# Patient Record
Sex: MALE | Race: WHITE | NOT HISPANIC OR LATINO | Employment: OTHER | URBAN - METROPOLITAN AREA
[De-identification: names, ages, dates, MRNs, and addresses within clinical notes are randomized per-mention and may not be internally consistent; named-entity substitution may affect disease eponyms.]

---

## 2023-11-22 ENCOUNTER — APPOINTMENT (OUTPATIENT)
Dept: OTOLARYNGOLOGY | Facility: CLINIC | Age: 78
End: 2023-11-22
Payer: MEDICARE

## 2023-11-22 ENCOUNTER — OFFICE VISIT (OUTPATIENT)
Dept: OTOLARYNGOLOGY | Facility: CLINIC | Age: 78
End: 2023-11-22
Payer: MEDICARE

## 2023-11-22 VITALS — HEIGHT: 70 IN | BODY MASS INDEX: 33.79 KG/M2 | TEMPERATURE: 97.3 F | WEIGHT: 236 LBS

## 2023-11-22 DIAGNOSIS — H61.23 BILATERAL IMPACTED CERUMEN: Primary | ICD-10-CM

## 2023-11-22 DIAGNOSIS — H90.3 BILATERAL SENSORINEURAL HEARING LOSS: ICD-10-CM

## 2023-11-22 PROCEDURE — 99213 OFFICE O/P EST LOW 20 MIN: CPT | Performed by: OTOLARYNGOLOGY

## 2023-11-22 PROCEDURE — 1036F TOBACCO NON-USER: CPT | Performed by: OTOLARYNGOLOGY

## 2023-11-22 PROCEDURE — 1159F MED LIST DOCD IN RCRD: CPT | Performed by: OTOLARYNGOLOGY

## 2023-11-22 RX ORDER — ASPIRIN 81 MG/1
81 TABLET ORAL DAILY
COMMUNITY
End: 2024-05-31 | Stop reason: ALTCHOICE

## 2023-11-22 ASSESSMENT — PATIENT HEALTH QUESTIONNAIRE - PHQ9
1. LITTLE INTEREST OR PLEASURE IN DOING THINGS: NOT AT ALL
2. FEELING DOWN, DEPRESSED OR HOPELESS: NOT AT ALL
SUM OF ALL RESPONSES TO PHQ9 QUESTIONS 1 & 2: 0

## 2023-11-22 NOTE — PROGRESS NOTES
"HPI  Eh Saenz is a 78 y.o. male here for routine cerumen management.  He has baseline sensorineural hearing loss but does not want expensive hearing aids.  Denies otalgia and otorrhea.      Past Medical History:   Diagnosis Date    Kidney stones     Personal history of malignant neoplasm, unspecified     History of malignant neoplasm    Personal history of other diseases of the circulatory system     History of hypertension    Personal history of other diseases of urinary system     History of kidney disease    Prostate cancer (CMS/HCC)             Medications:     Current Outpatient Medications:     aspirin 81 mg EC tablet, Take 1 tablet (81 mg) by mouth once daily., Disp: , Rfl:      Allergies:  Allergies   Allergen Reactions    Bactrim [Sulfamethoxazole-Trimethoprim] Unknown    Ciprofloxacin Unknown    Dicyclomine Unknown    Meloxicam Unknown        Physical Exam:  Last Recorded Vitals  Temperature 36.3 °C (97.3 °F), height 1.778 m (5' 10\"), weight 107 kg (236 lb).  General:     General appearance: Well-developed, well-nourished in no acute distress.       Voice:  normal       Head/face: Normal appearance; nontender to palpation     Facial nerve function: Normal and symmetric bilaterally.    Oral/oropharynx:     Oral vestibule: Normal labial and gingival mucosa     Tongue/floor of mouth: Normal without lesion     Oropharynx: Clear.  No lesions present of the hard/soft palate, posterior pharynx    Neck:     Neck: Normal appearance, trachea midline     Salivary glands: Normal to palpation bilaterally     Lymph nodes: No cervical lymphadenopathy to palpation     Thyroid: No thyromegaly.  No palpable nodules     Range of motion: Normal    Neurological:     Cortical functions: Alert and oriented x3, appropriate affect       Larynx/hypopharynx:     Laryngeal findings: Mirror exam inadequate or limited secondary to enlarged base of tongue and/or excessive gagging    Ear:     Ear canal: Normal bilaterally after " cleaning cerumen impaction bilaterally with wire-loop and microscope     Tympanic membrane: Intact and mobile bilaterally     Pinna: Normal bilaterally     Hearing:  Gross hearing assessment normal by voice    Nose:     Visualized using: Anterior rhinoscopy     Nasopharynx: Inadequate mirror exam secondary to gag, anatomy.       Nasal dorsum: Nontraumatic midline appearance     Septum: Midline     Inferior turbinates: Normally sized     Mucosa: Bilateral, pink, normal appearing       Assessment/Plan   Ears cleaned bilaterally.  Recommend continue routine maintenance and see him back in 4 to 6 months, sooner as needed         Jarrod Patterson MD

## 2024-01-17 ENCOUNTER — APPOINTMENT (OUTPATIENT)
Dept: OTOLARYNGOLOGY | Facility: CLINIC | Age: 79
End: 2024-01-17
Payer: MEDICARE

## 2024-04-03 ENCOUNTER — APPOINTMENT (OUTPATIENT)
Dept: OTOLARYNGOLOGY | Facility: CLINIC | Age: 79
End: 2024-04-03
Payer: MEDICARE

## 2024-04-15 ENCOUNTER — APPOINTMENT (OUTPATIENT)
Dept: RADIOLOGY | Facility: HOSPITAL | Age: 79
End: 2024-04-15
Payer: MEDICARE

## 2024-04-15 ENCOUNTER — APPOINTMENT (OUTPATIENT)
Dept: CARDIOLOGY | Facility: HOSPITAL | Age: 79
End: 2024-04-15
Payer: MEDICARE

## 2024-04-15 ENCOUNTER — HOSPITAL ENCOUNTER (OUTPATIENT)
Facility: HOSPITAL | Age: 79
Setting detail: OBSERVATION
Discharge: HOME | End: 2024-04-20
Attending: STUDENT IN AN ORGANIZED HEALTH CARE EDUCATION/TRAINING PROGRAM | Admitting: INTERNAL MEDICINE
Payer: MEDICARE

## 2024-04-15 DIAGNOSIS — R94.31 ABNORMAL ELECTROCARDIOGRAM (ECG) (EKG): ICD-10-CM

## 2024-04-15 DIAGNOSIS — J01.90 ACUTE SINUSITIS, RECURRENCE NOT SPECIFIED, UNSPECIFIED LOCATION: ICD-10-CM

## 2024-04-15 DIAGNOSIS — I50.9 ACUTE ON CHRONIC HEART FAILURE, UNSPECIFIED HEART FAILURE TYPE (MULTI): Primary | ICD-10-CM

## 2024-04-15 DIAGNOSIS — R79.89 ELEVATED TROPONIN: ICD-10-CM

## 2024-04-15 DIAGNOSIS — R07.9 CHEST PAIN, UNSPECIFIED TYPE: ICD-10-CM

## 2024-04-15 LAB
ALBUMIN SERPL-MCNC: 3.9 G/DL (ref 3.5–5)
ALP BLD-CCNC: 67 U/L (ref 35–125)
ALT SERPL-CCNC: 18 U/L (ref 5–40)
ANION GAP SERPL CALC-SCNC: 9 MMOL/L
APPEARANCE UR: CLEAR
APPEARANCE UR: CLEAR
AST SERPL-CCNC: 19 U/L (ref 5–40)
BASOPHILS # BLD AUTO: 0.03 X10*3/UL (ref 0–0.1)
BASOPHILS NFR BLD AUTO: 0.4 %
BILIRUB SERPL-MCNC: 2.1 MG/DL (ref 0.1–1.2)
BILIRUB UR STRIP.AUTO-MCNC: NEGATIVE MG/DL
BILIRUB UR STRIP.AUTO-MCNC: NEGATIVE MG/DL
BUN SERPL-MCNC: 19 MG/DL (ref 8–25)
CALCIUM SERPL-MCNC: 9.8 MG/DL (ref 8.5–10.4)
CHLORIDE SERPL-SCNC: 102 MMOL/L (ref 97–107)
CO2 SERPL-SCNC: 27 MMOL/L (ref 24–31)
COLOR UR: COLORLESS
COLOR UR: YELLOW
CREAT SERPL-MCNC: 1.2 MG/DL (ref 0.4–1.6)
EGFRCR SERPLBLD CKD-EPI 2021: 61 ML/MIN/1.73M*2
EOSINOPHIL # BLD AUTO: 0.1 X10*3/UL (ref 0–0.4)
EOSINOPHIL NFR BLD AUTO: 1.3 %
ERYTHROCYTE [DISTWIDTH] IN BLOOD BY AUTOMATED COUNT: 14.7 % (ref 11.5–14.5)
FLUAV RNA RESP QL NAA+PROBE: NOT DETECTED
FLUBV RNA RESP QL NAA+PROBE: NOT DETECTED
GLUCOSE SERPL-MCNC: 111 MG/DL (ref 65–99)
GLUCOSE UR STRIP.AUTO-MCNC: NORMAL MG/DL
GLUCOSE UR STRIP.AUTO-MCNC: NORMAL MG/DL
HCT VFR BLD AUTO: 42.2 % (ref 41–52)
HGB BLD-MCNC: 13.2 G/DL (ref 13.5–17.5)
IMM GRANULOCYTES # BLD AUTO: 0.03 X10*3/UL (ref 0–0.5)
IMM GRANULOCYTES NFR BLD AUTO: 0.4 % (ref 0–0.9)
KETONES UR STRIP.AUTO-MCNC: NEGATIVE MG/DL
KETONES UR STRIP.AUTO-MCNC: NEGATIVE MG/DL
LACTATE BLDV-SCNC: 1.9 MMOL/L (ref 0.4–2)
LEUKOCYTE ESTERASE UR QL STRIP.AUTO: ABNORMAL
LEUKOCYTE ESTERASE UR QL STRIP.AUTO: NEGATIVE
LIPASE SERPL-CCNC: 32 U/L (ref 16–63)
LYMPHOCYTES # BLD AUTO: 0.77 X10*3/UL (ref 0.8–3)
LYMPHOCYTES NFR BLD AUTO: 10.1 %
MAGNESIUM SERPL-MCNC: 2.2 MG/DL (ref 1.6–3.1)
MAGNESIUM SERPL-MCNC: 2.2 MG/DL (ref 1.6–3.1)
MCH RBC QN AUTO: 29.5 PG (ref 26–34)
MCHC RBC AUTO-ENTMCNC: 31.3 G/DL (ref 32–36)
MCV RBC AUTO: 94 FL (ref 80–100)
MONOCYTES # BLD AUTO: 1.05 X10*3/UL (ref 0.05–0.8)
MONOCYTES NFR BLD AUTO: 13.7 %
MUCOUS THREADS #/AREA URNS AUTO: NORMAL /LPF
NEUTROPHILS # BLD AUTO: 5.66 X10*3/UL (ref 1.6–5.5)
NEUTROPHILS NFR BLD AUTO: 74.1 %
NITRITE UR QL STRIP.AUTO: NEGATIVE
NITRITE UR QL STRIP.AUTO: NEGATIVE
NRBC BLD-RTO: 0 /100 WBCS (ref 0–0)
NT-PROBNP SERPL-MCNC: 4289 PG/ML (ref 0–852)
PH UR STRIP.AUTO: 6.5 [PH]
PH UR STRIP.AUTO: 6.5 [PH]
PLATELET # BLD AUTO: 194 X10*3/UL (ref 150–450)
POTASSIUM SERPL-SCNC: 5 MMOL/L (ref 3.4–5.1)
PROT SERPL-MCNC: 6.6 G/DL (ref 5.9–7.9)
PROT UR STRIP.AUTO-MCNC: ABNORMAL MG/DL
PROT UR STRIP.AUTO-MCNC: NEGATIVE MG/DL
RBC # BLD AUTO: 4.48 X10*6/UL (ref 4.5–5.9)
RBC # UR STRIP.AUTO: NEGATIVE /UL
RBC # UR STRIP.AUTO: NEGATIVE /UL
RBC #/AREA URNS AUTO: NORMAL /HPF
SARS-COV-2 RNA RESP QL NAA+PROBE: NOT DETECTED
SODIUM SERPL-SCNC: 138 MMOL/L (ref 133–145)
SP GR UR STRIP.AUTO: 1.01
SP GR UR STRIP.AUTO: 1.02
TROPONIN T SERPL-MCNC: 72 NG/L
TROPONIN T SERPL-MCNC: 79 NG/L
TROPONIN T SERPL-MCNC: 91 NG/L
UROBILINOGEN UR STRIP.AUTO-MCNC: ABNORMAL MG/DL
UROBILINOGEN UR STRIP.AUTO-MCNC: NORMAL MG/DL
WBC # BLD AUTO: 7.6 X10*3/UL (ref 4.4–11.3)
WBC #/AREA URNS AUTO: NORMAL /HPF

## 2024-04-15 PROCEDURE — 85025 COMPLETE CBC W/AUTO DIFF WBC: CPT | Performed by: CLINICAL NURSE SPECIALIST

## 2024-04-15 PROCEDURE — 81001 URINALYSIS AUTO W/SCOPE: CPT | Performed by: CLINICAL NURSE SPECIALIST

## 2024-04-15 PROCEDURE — 83690 ASSAY OF LIPASE: CPT | Performed by: CLINICAL NURSE SPECIALIST

## 2024-04-15 PROCEDURE — 80053 COMPREHEN METABOLIC PANEL: CPT | Performed by: CLINICAL NURSE SPECIALIST

## 2024-04-15 PROCEDURE — 96361 HYDRATE IV INFUSION ADD-ON: CPT

## 2024-04-15 PROCEDURE — 84484 ASSAY OF TROPONIN QUANT: CPT | Performed by: CLINICAL NURSE SPECIALIST

## 2024-04-15 PROCEDURE — 70450 CT HEAD/BRAIN W/O DYE: CPT | Performed by: RADIOLOGY

## 2024-04-15 PROCEDURE — G0378 HOSPITAL OBSERVATION PER HR: HCPCS

## 2024-04-15 PROCEDURE — 83735 ASSAY OF MAGNESIUM: CPT | Mod: 91 | Performed by: INTERNAL MEDICINE

## 2024-04-15 PROCEDURE — 83735 ASSAY OF MAGNESIUM: CPT | Performed by: CLINICAL NURSE SPECIALIST

## 2024-04-15 PROCEDURE — 2500000004 HC RX 250 GENERAL PHARMACY W/ HCPCS (ALT 636 FOR OP/ED): Performed by: INTERNAL MEDICINE

## 2024-04-15 PROCEDURE — 83880 ASSAY OF NATRIURETIC PEPTIDE: CPT | Performed by: CLINICAL NURSE SPECIALIST

## 2024-04-15 PROCEDURE — 2500000001 HC RX 250 WO HCPCS SELF ADMINISTERED DRUGS (ALT 637 FOR MEDICARE OP): Performed by: INTERNAL MEDICINE

## 2024-04-15 PROCEDURE — 71046 X-RAY EXAM CHEST 2 VIEWS: CPT

## 2024-04-15 PROCEDURE — 83605 ASSAY OF LACTIC ACID: CPT | Performed by: CLINICAL NURSE SPECIALIST

## 2024-04-15 PROCEDURE — 96374 THER/PROPH/DIAG INJ IV PUSH: CPT

## 2024-04-15 PROCEDURE — 87086 URINE CULTURE/COLONY COUNT: CPT | Mod: TRILAB,WESLAB | Performed by: CLINICAL NURSE SPECIALIST

## 2024-04-15 PROCEDURE — 71046 X-RAY EXAM CHEST 2 VIEWS: CPT | Performed by: RADIOLOGY

## 2024-04-15 PROCEDURE — 36415 COLL VENOUS BLD VENIPUNCTURE: CPT | Performed by: CLINICAL NURSE SPECIALIST

## 2024-04-15 PROCEDURE — 2500000004 HC RX 250 GENERAL PHARMACY W/ HCPCS (ALT 636 FOR OP/ED): Performed by: STUDENT IN AN ORGANIZED HEALTH CARE EDUCATION/TRAINING PROGRAM

## 2024-04-15 PROCEDURE — 81003 URINALYSIS AUTO W/O SCOPE: CPT | Mod: 59 | Performed by: INTERNAL MEDICINE

## 2024-04-15 PROCEDURE — 96376 TX/PRO/DX INJ SAME DRUG ADON: CPT

## 2024-04-15 PROCEDURE — 96372 THER/PROPH/DIAG INJ SC/IM: CPT | Performed by: INTERNAL MEDICINE

## 2024-04-15 PROCEDURE — 87636 SARSCOV2 & INF A&B AMP PRB: CPT | Performed by: CLINICAL NURSE SPECIALIST

## 2024-04-15 PROCEDURE — 2500000004 HC RX 250 GENERAL PHARMACY W/ HCPCS (ALT 636 FOR OP/ED): Performed by: CLINICAL NURSE SPECIALIST

## 2024-04-15 PROCEDURE — 87040 BLOOD CULTURE FOR BACTERIA: CPT | Mod: TRILAB,91 | Performed by: CLINICAL NURSE SPECIALIST

## 2024-04-15 PROCEDURE — 93005 ELECTROCARDIOGRAM TRACING: CPT

## 2024-04-15 PROCEDURE — 70450 CT HEAD/BRAIN W/O DYE: CPT

## 2024-04-15 PROCEDURE — 93010 ELECTROCARDIOGRAM REPORT: CPT | Performed by: INTERNAL MEDICINE

## 2024-04-15 PROCEDURE — 99285 EMERGENCY DEPT VISIT HI MDM: CPT | Mod: 25

## 2024-04-15 RX ORDER — GUAIFENESIN/DEXTROMETHORPHAN 100-10MG/5
5 SYRUP ORAL EVERY 4 HOURS PRN
Status: DISCONTINUED | OUTPATIENT
Start: 2024-04-15 | End: 2024-04-17

## 2024-04-15 RX ORDER — ATORVASTATIN CALCIUM 20 MG/1
20 TABLET, FILM COATED ORAL NIGHTLY
COMMUNITY

## 2024-04-15 RX ORDER — ACETAMINOPHEN 325 MG/1
650 TABLET ORAL EVERY 6 HOURS PRN
Status: DISCONTINUED | OUTPATIENT
Start: 2024-04-15 | End: 2024-04-20 | Stop reason: HOSPADM

## 2024-04-15 RX ORDER — ACETAMINOPHEN 160 MG/5ML
650 SOLUTION ORAL EVERY 6 HOURS PRN
Status: DISCONTINUED | OUTPATIENT
Start: 2024-04-15 | End: 2024-04-20 | Stop reason: HOSPADM

## 2024-04-15 RX ORDER — GUAIFENESIN 600 MG/1
600 TABLET, EXTENDED RELEASE ORAL EVERY 12 HOURS PRN
Status: DISCONTINUED | OUTPATIENT
Start: 2024-04-15 | End: 2024-04-20 | Stop reason: HOSPADM

## 2024-04-15 RX ORDER — FUROSEMIDE 10 MG/ML
40 INJECTION INTRAMUSCULAR; INTRAVENOUS ONCE
Status: DISCONTINUED | OUTPATIENT
Start: 2024-04-15 | End: 2024-04-15 | Stop reason: SDUPTHER

## 2024-04-15 RX ORDER — BISMUTH SUBSALICYLATE 262 MG
1 TABLET,CHEWABLE ORAL DAILY
Status: DISCONTINUED | OUTPATIENT
Start: 2024-04-15 | End: 2024-04-20 | Stop reason: HOSPADM

## 2024-04-15 RX ORDER — ASPIRIN 81 MG/1
TABLET ORAL
COMMUNITY
End: 2024-04-15 | Stop reason: ENTERED-IN-ERROR

## 2024-04-15 RX ORDER — ATORVASTATIN CALCIUM 40 MG/1
40 TABLET, FILM COATED ORAL NIGHTLY
Status: DISCONTINUED | OUTPATIENT
Start: 2024-04-15 | End: 2024-04-20 | Stop reason: HOSPADM

## 2024-04-15 RX ORDER — VIT C/E/ZN/COPPR/LUTEIN/ZEAXAN 250MG-90MG
25 CAPSULE ORAL DAILY
COMMUNITY

## 2024-04-15 RX ORDER — ACETAMINOPHEN 650 MG/1
650 SUPPOSITORY RECTAL EVERY 6 HOURS PRN
Status: DISCONTINUED | OUTPATIENT
Start: 2024-04-15 | End: 2024-04-20 | Stop reason: HOSPADM

## 2024-04-15 RX ORDER — FUROSEMIDE 40 MG/1
40 TABLET ORAL DAILY
Status: DISCONTINUED | OUTPATIENT
Start: 2024-04-16 | End: 2024-04-20 | Stop reason: HOSPADM

## 2024-04-15 RX ORDER — HEPARIN SODIUM 5000 [USP'U]/ML
5000 INJECTION, SOLUTION INTRAVENOUS; SUBCUTANEOUS EVERY 8 HOURS
Status: DISCONTINUED | OUTPATIENT
Start: 2024-04-15 | End: 2024-04-15

## 2024-04-15 RX ORDER — FUROSEMIDE 40 MG/1
40 TABLET ORAL DAILY
COMMUNITY

## 2024-04-15 RX ORDER — POLYETHYLENE GLYCOL 3350 17 G/17G
17 POWDER, FOR SOLUTION ORAL DAILY PRN
Status: DISCONTINUED | OUTPATIENT
Start: 2024-04-15 | End: 2024-04-20 | Stop reason: HOSPADM

## 2024-04-15 RX ORDER — ACETAMINOPHEN 325 MG/1
650 TABLET ORAL ONCE
Status: COMPLETED | OUTPATIENT
Start: 2024-04-15 | End: 2024-04-15

## 2024-04-15 RX ORDER — ASPIRIN 81 MG/1
81 TABLET ORAL DAILY
Status: DISCONTINUED | OUTPATIENT
Start: 2024-04-15 | End: 2024-04-20 | Stop reason: HOSPADM

## 2024-04-15 RX ORDER — BISMUTH SUBSALICYLATE 262 MG
1 TABLET,CHEWABLE ORAL DAILY
COMMUNITY

## 2024-04-15 RX ORDER — FUROSEMIDE 10 MG/ML
40 INJECTION INTRAMUSCULAR; INTRAVENOUS EVERY 8 HOURS
Status: DISPENSED | OUTPATIENT
Start: 2024-04-15 | End: 2024-04-16

## 2024-04-15 RX ORDER — MAGNESIUM SULFATE HEPTAHYDRATE 40 MG/ML
2 INJECTION, SOLUTION INTRAVENOUS ONCE
Status: DISCONTINUED | OUTPATIENT
Start: 2024-04-15 | End: 2024-04-17

## 2024-04-15 RX ORDER — ACETAMINOPHEN 325 MG/1
650 TABLET ORAL ONCE
Status: DISCONTINUED | OUTPATIENT
Start: 2024-04-15 | End: 2024-04-15 | Stop reason: SDUPTHER

## 2024-04-15 RX ORDER — POTASSIUM CHLORIDE 20 MEQ/1
20 TABLET, EXTENDED RELEASE ORAL DAILY
COMMUNITY

## 2024-04-15 RX ORDER — FUROSEMIDE 10 MG/ML
40 INJECTION INTRAMUSCULAR; INTRAVENOUS ONCE
Status: COMPLETED | OUTPATIENT
Start: 2024-04-15 | End: 2024-04-15

## 2024-04-15 RX ORDER — POTASSIUM CHLORIDE 20 MEQ/1
20 TABLET, EXTENDED RELEASE ORAL DAILY
Status: DISCONTINUED | OUTPATIENT
Start: 2024-04-16 | End: 2024-04-20 | Stop reason: HOSPADM

## 2024-04-15 RX ORDER — FUROSEMIDE 10 MG/ML
40 INJECTION INTRAMUSCULAR; INTRAVENOUS EVERY 8 HOURS
Status: DISCONTINUED | OUTPATIENT
Start: 2024-04-15 | End: 2024-04-15 | Stop reason: SDUPTHER

## 2024-04-15 RX ORDER — HEPARIN SODIUM 5000 [USP'U]/ML
5000 INJECTION, SOLUTION INTRAVENOUS; SUBCUTANEOUS EVERY 8 HOURS SCHEDULED
Status: DISCONTINUED | OUTPATIENT
Start: 2024-04-15 | End: 2024-04-20 | Stop reason: HOSPADM

## 2024-04-15 RX ORDER — CHOLECALCIFEROL (VITAMIN D3) 125 MCG
5000 CAPSULE ORAL DAILY
Status: DISCONTINUED | OUTPATIENT
Start: 2024-04-15 | End: 2024-04-20 | Stop reason: HOSPADM

## 2024-04-15 RX ADMIN — SACUBITRIL AND VALSARTAN 1 TABLET: 24; 26 TABLET, FILM COATED ORAL at 21:35

## 2024-04-15 RX ADMIN — FUROSEMIDE 40 MG: 10 INJECTION, SOLUTION INTRAMUSCULAR; INTRAVENOUS at 21:34

## 2024-04-15 RX ADMIN — ACETAMINOPHEN 650 MG: 325 TABLET ORAL at 10:35

## 2024-04-15 RX ADMIN — HEPARIN SODIUM 5000 UNITS: 5000 INJECTION, SOLUTION INTRAVENOUS; SUBCUTANEOUS at 21:34

## 2024-04-15 RX ADMIN — FUROSEMIDE 40 MG: 10 INJECTION, SOLUTION INTRAMUSCULAR; INTRAVENOUS at 13:06

## 2024-04-15 RX ADMIN — SODIUM CHLORIDE 500 ML: 9 INJECTION, SOLUTION INTRAVENOUS at 10:25

## 2024-04-15 RX ADMIN — ATORVASTATIN CALCIUM 40 MG: 40 TABLET, FILM COATED ORAL at 21:35

## 2024-04-15 SDOH — HEALTH STABILITY: MENTAL HEALTH
EXPERIENCED ANY OF THE FOLLOWING LIFE EVENTS: DEATH OF FAMILY/FRIEND;SOCIAL LOSS (BANKRUPTCY, DIVORCE, WORK-RELATED STRESS)

## 2024-04-15 SDOH — SOCIAL STABILITY: SOCIAL INSECURITY: DO YOU FEEL ANYONE HAS EXPLOITED OR TAKEN ADVANTAGE OF YOU FINANCIALLY OR OF YOUR PERSONAL PROPERTY?: NO

## 2024-04-15 SDOH — SOCIAL STABILITY: SOCIAL INSECURITY: HAVE YOU HAD THOUGHTS OF HARMING ANYONE ELSE?: NO

## 2024-04-15 SDOH — SOCIAL STABILITY: SOCIAL INSECURITY: WERE YOU ABLE TO COMPLETE ALL THE BEHAVIORAL HEALTH SCREENINGS?: YES

## 2024-04-15 SDOH — SOCIAL STABILITY: SOCIAL INSECURITY: ABUSE: ADULT

## 2024-04-15 SDOH — SOCIAL STABILITY: SOCIAL INSECURITY: DOES ANYONE TRY TO KEEP YOU FROM HAVING/CONTACTING OTHER FRIENDS OR DOING THINGS OUTSIDE YOUR HOME?: NO

## 2024-04-15 SDOH — SOCIAL STABILITY: SOCIAL INSECURITY: ARE YOU OR HAVE YOU BEEN THREATENED OR ABUSED PHYSICALLY, EMOTIONALLY, OR SEXUALLY BY ANYONE?: NO

## 2024-04-15 SDOH — SOCIAL STABILITY: SOCIAL INSECURITY: HAS ANYONE EVER THREATENED TO HURT YOUR FAMILY OR YOUR PETS?: NO

## 2024-04-15 SDOH — SOCIAL STABILITY: SOCIAL INSECURITY: DO YOU FEEL UNSAFE GOING BACK TO THE PLACE WHERE YOU ARE LIVING?: NO

## 2024-04-15 SDOH — SOCIAL STABILITY: SOCIAL INSECURITY: ARE THERE ANY APPARENT SIGNS OF INJURIES/BEHAVIORS THAT COULD BE RELATED TO ABUSE/NEGLECT?: NO

## 2024-04-15 ASSESSMENT — PATIENT HEALTH QUESTIONNAIRE - PHQ9
1. LITTLE INTEREST OR PLEASURE IN DOING THINGS: NOT AT ALL
SUM OF ALL RESPONSES TO PHQ9 QUESTIONS 1 & 2: 0
2. FEELING DOWN, DEPRESSED OR HOPELESS: NOT AT ALL

## 2024-04-15 ASSESSMENT — LIFESTYLE VARIABLES
AUDIT-C TOTAL SCORE: 0
SKIP TO QUESTIONS 9-10: 1
SUBSTANCE_ABUSE_PAST_12_MONTHS: NO
HOW OFTEN DO YOU HAVE 6 OR MORE DRINKS ON ONE OCCASION: NEVER
AUDIT-C TOTAL SCORE: 0
HOW OFTEN DO YOU HAVE A DRINK CONTAINING ALCOHOL: NEVER
HOW MANY STANDARD DRINKS CONTAINING ALCOHOL DO YOU HAVE ON A TYPICAL DAY: PATIENT DOES NOT DRINK
PRESCIPTION_ABUSE_PAST_12_MONTHS: NO

## 2024-04-15 ASSESSMENT — PAIN SCALES - GENERAL
PAINLEVEL_OUTOF10: 4
PAINLEVEL_OUTOF10: 0 - NO PAIN
PAINLEVEL_OUTOF10: 4
PAINLEVEL_OUTOF10: 4

## 2024-04-15 ASSESSMENT — ACTIVITIES OF DAILY LIVING (ADL)
WALKS IN HOME: INDEPENDENT
HEARING - LEFT EAR: DIFFICULTY WITH NOISE
ADEQUATE_TO_COMPLETE_ADL: YES
FEEDING YOURSELF: INDEPENDENT
DRESSING YOURSELF: INDEPENDENT
TOILETING: INDEPENDENT
PATIENT'S MEMORY ADEQUATE TO SAFELY COMPLETE DAILY ACTIVITIES?: YES
HEARING - RIGHT EAR: DIFFICULTY WITH NOISE
JUDGMENT_ADEQUATE_SAFELY_COMPLETE_DAILY_ACTIVITIES: YES
BATHING: INDEPENDENT
GROOMING: INDEPENDENT

## 2024-04-15 ASSESSMENT — HEART SCORE
AGE: 65+
RISK FACTORS: 1-2 RISK FACTORS
HISTORY: MODERATELY SUSPICIOUS
TROPONIN: GREATER THAN OR EQUAL TO 3 TIMES NORMAL LIMIT
ECG: NORMAL
HEART SCORE: 6

## 2024-04-15 ASSESSMENT — COLUMBIA-SUICIDE SEVERITY RATING SCALE - C-SSRS
6. HAVE YOU EVER DONE ANYTHING, STARTED TO DO ANYTHING, OR PREPARED TO DO ANYTHING TO END YOUR LIFE?: NO
2. HAVE YOU ACTUALLY HAD ANY THOUGHTS OF KILLING YOURSELF?: NO
1. IN THE PAST MONTH, HAVE YOU WISHED YOU WERE DEAD OR WISHED YOU COULD GO TO SLEEP AND NOT WAKE UP?: NO

## 2024-04-15 ASSESSMENT — PAIN DESCRIPTION - PAIN TYPE: TYPE: ACUTE PAIN

## 2024-04-15 ASSESSMENT — PAIN - FUNCTIONAL ASSESSMENT
PAIN_FUNCTIONAL_ASSESSMENT: 0-10

## 2024-04-15 ASSESSMENT — PAIN DESCRIPTION - LOCATION
LOCATION: CHEST
LOCATION: CHEST

## 2024-04-15 NOTE — PROGRESS NOTES
Pharmacy Medication History Review    Eh Saenz is a 78 y.o. male admitted for chest pain. Pharmacy reviewed the patient's [id number]-[id number] medications and allergies for accuracy.    The list below reflectives the updated PTA list. Please review each medication in order reconciliation for additional clarification and justification.  Prior to Admission Medications   Prescriptions Last Dose Informant Patient Reported? Taking?   aspirin 81 mg EC tablet 4/14/2024 at am  Yes Yes   Sig: Take 1 tablet (81 mg) by mouth once daily.   atorvastatin calcium (ATORVASTATIN ORAL) 4/14/2024 at pm  Yes Yes   Sig: Take 1 tablet by mouth once daily at bedtime.   cholecalciferol (Vitamin D-3) 25 MCG (1000 UT) capsule 4/14/2024 at am  Yes Yes   Sig: Take 1 capsule (25 mcg) by mouth once daily.   furosemide (Lasix) 40 mg tablet 4/14/2024 at am  Yes Yes   Sig: Take 1 tablet (40 mg) by mouth once daily.   multivitamin tablet 4/14/2024 at am  Yes Yes   Sig: Take 1 tablet by mouth once daily.   potassium chloride CR 20 mEq ER tablet 4/14/2024 at am  Yes Yes   Sig: Take 1 tablet (20 mEq) by mouth once daily. Do not crush or chew.   sacubitril/valsartan (ENTRESTO ORAL) 4/14/2024 at pm  Yes Yes   Sig: Take 0.5 tablets by mouth 2 times a day.      Facility-Administered Medications: None          The list below reflectives the updated allergy list. Please review each documented allergy for additional clarification and justification.  Allergies  Reviewed by Elle Bernabe CPhT on 4/15/2024        Severity Reactions Comments    Bactrim [sulfamethoxazole-trimethoprim] Not Specified Unknown     Ciprofloxacin Not Specified Unknown     Dicyclomine Not Specified Unknown     Meloxicam Not Specified Unknown     Rosuvastatin Low Myalgia, Rash, Unknown             Below are additional concerns with the patient's PTA list.  - pt is taking Entresto but is unsure of dose  - pt is taking Atorvastatin but is unsure of dose.      ELLE BERNABE  CPhT

## 2024-04-15 NOTE — ED NOTES
This RN educated pt in depth on CHF education.   Pt verbalized understanding.   Family updated at this time. Spoke with son , Alex  Nurse to nurse given to Shaniqua RN      Carolina Nava RN  04/15/24 5856

## 2024-04-15 NOTE — ED PROVIDER NOTES
Supervisory note:  Patient seen in conjunction with Gwendolyn Ramirez NP.  Patient presents with pain in the chest as well as shortness of breath.  He reports that he has gained about 8 pounds recently.  He states that he has difficulty taking Lasix, Entresto, and potassium for his heart failure.  He states that he was hospitalized about a month ago for similar symptoms.  He reports that he becomes very short of breath when he lays flat or exerts himself.  On examination, heart and lung sounds are unremarkable.  There is 2+ pretibial edema of bilateral lower extremities.    Examination and laboratory studies felt to be consistent with heart failure exacerbation.  Patient was accepted to medicine service.  Patient given 40 mg of IV Lasix for diuresis.  My suspicion for acute coronary syndrome is low however.  PE felt to be very unlikely.    I personally saw the patient and made/approved the management plan and take responsiblity for the patient management.  Parts of this chart were completed with dictation software, please excuse any errors in transcription.     Keo Carreno MD  04/15/24 6065

## 2024-04-15 NOTE — ED PROVIDER NOTES
Department of Emergency Medicine   ED  Provider Note  Admit Date/RoomTime: 4/15/2024 10:13 AM  ED Room: 319/319-A        History of Present Illness:  Chief Complaint   Patient presents with    Chest Pain    Shortness of Breath     Takes lasix daily. O2 sat norm. Feels like he cannot catch enough air when ambulating          Eh Saenz is a 78 y.o. male history of congestive heart failure presenting to the ED for generalized weakness for the past 2 weeks.  Decreased appetite.  Has had 8 pound weight gain over the last 2 weeks.  He is on Lasix and Entresto for congestive heart failure follows with Dr. Coats , today had some left-sided chest pain and pectoral muscle per the patient.  Wheezes musculoskeletal only lasted for few seconds.  Received aspirin and nitro he is chest pain-free at this time.  He denies any nausea does complain of some intermittent lightheadedness and has a headache across his forehead that has been present there on and off for the past 2 weeks.  Reports a headache is getting worse in intensity over the last 2 weeks.  Complains of a dull headache at this time.  Is not the worst headache of his life.  He denies any fever chills cough congestion runny nose.  Denies any shortness of breath complains of swelling in both legs.  He did not take his Lasix today.  He has an appointment with his doctor next week for evaluation.  He denies nausea vomiting diarrhea no abdominal pain.  No no pressure burning or frequency of urination.  He states he believes his potassium is off and that is why he is feeling so bad.  Patient does report he has been altering his medications due to what he feels like he needs for potassium and he does not want to get up in the middle the night to urinate.  Review of Systems:   Pertinent positives and negatives are stated within HPI, all other systems reviewed and are negative.        --------------------------------------------- PAST HISTORY  ---------------------------------------------  Past Medical History:  has a past medical history of Kidney stones, Personal history of malignant neoplasm, unspecified, Personal history of other diseases of the circulatory system, Personal history of other diseases of urinary system, and Prostate cancer (Multi).  Past Surgical History:  has a past surgical history that includes Other surgical history (10/18/2021); Other surgical history (10/18/2021); and Cataract extraction.  Social History:  reports that he has never smoked. He has never used smokeless tobacco.  Family History: family history is not on file.. Unless otherwise noted, family history is non contributory  The patient’s home medications have been reviewed.  Allergies: Bactrim [sulfamethoxazole-trimethoprim], Ciprofloxacin, Dicyclomine, Meloxicam, and Rosuvastatin        ---------------------------------------------------PHYSICAL EXAM--------------------------------------    GENERAL APPEARANCE: Awake and alert.   VITAL SIGNS: As per the nurses' triage record.  Hypotensive  HEENT: Normocephalic, atraumatic. Extraocular muscles are intact. Pupils equal round and reactive to light. Conjunctiva are pink. Negative scleral icterus. Mucous membranes are dry tongue in the midline. Pharynx was without erythema or exudates, uvula midline  NECK: Soft Nontender and supple, full gross ROM, no meningeal signs.  CHEST: Nontender to palpation. Clear to auscultation bilaterally. No rales, rhonchi, or wheezing.   HEART: S1, S2. Regular rate and rhythm. No murmurs, gallops or rubs.  Strong and equal pulses in the extremities.   ABDOMEN: Soft, nontender, nondistended, positive bowel sounds, no palpable masses.  Negative for Guillen sign abraised point tenderness.  No peritoneal signs noted.  No rashes lesions or sores.  No bruising  Back: No pain palpation of the thoracic or lumbar spine no step-offs crepitus or bruising no CVA tenderness no saddle  paresthesias  MUSCULCSKELETAL: The calves are nontender to palpation. Full gross active range of motion.  +2 pitting edema bilateral lower extremities peripheral pulses intact.  NEUROLOGICAL: Awake, alert and oriented x 3. Power intact in the upper and lower extremities. Sensation is intact to light touch in the upper and lower extremities.   IMMUNOLOGICAL: No lymphatic streaking noted   DERM: No petechiae, rashes, or ecchymoses.          ------------------------- NURSING NOTES AND VITALS REVIEWED ---------------------------  The nursing notes within the ED encounter and vital signs as below have been reviewed by myself  /78 (BP Location: Left arm, Patient Position: Sitting)   Pulse 97   Temp 37 °C (98.6 °F) (Oral)   Resp 18   Wt 108 kg (238 lb 4.8 oz)   SpO2 97%   BMI 34.19 kg/m²     Oxygen Saturation Interpretation: 97% room air    The cardiac monitor revealed sinus rhythm with a heart rate in the 90s as interpreted by me. The cardiac monitor was ordered secondary to the patient's heart rate and to monitor the patient for dysrhythmia.       The patient’s available past medical records and past encounters were reviewed.          -----------------------DIAGNOSTIC RESULTS------------------------  LABS:    Labs Reviewed   CBC WITH AUTO DIFFERENTIAL - Abnormal       Result Value    WBC 7.6      nRBC 0.0      RBC 4.48 (*)     Hemoglobin 13.2 (*)     Hematocrit 42.2      MCV 94      MCH 29.5      MCHC 31.3 (*)     RDW 14.7 (*)     Platelets 194      Neutrophils % 74.1      Immature Granulocytes %, Automated 0.4      Lymphocytes % 10.1      Monocytes % 13.7      Eosinophils % 1.3      Basophils % 0.4      Neutrophils Absolute 5.66 (*)     Immature Granulocytes Absolute, Automated 0.03      Lymphocytes Absolute 0.77 (*)     Monocytes Absolute 1.05 (*)     Eosinophils Absolute 0.10      Basophils Absolute 0.03     COMPREHENSIVE METABOLIC PANEL - Abnormal    Glucose 111 (*)     Sodium 138      Potassium 5.0       Chloride 102      Bicarbonate 27      Urea Nitrogen 19      Creatinine 1.20      eGFR 61      Calcium 9.8      Albumin 3.9      Alkaline Phosphatase 67      Total Protein 6.6      AST 19      Bilirubin, Total 2.1 (*)     ALT 18      Anion Gap 9     N-TERMINAL PROBNP - Abnormal    PROBNP 4,289 (*)     Narrative:     Reference ranges are based on clinical submission data. These ranges represent the 95th percentile of normal cut-off points. As NT Pro- BNP values approach 1000 pg/ml, clinical symptoms are more likely associated with CHF.   SERIAL TROPONIN, INITIAL ([facility]) - Abnormal    Troponin T, High Sensitivity 91 (*)    URINALYSIS WITH REFLEX CULTURE AND MICROSCOPIC - Abnormal    Color, Urine Yellow      Appearance, Urine Clear      Specific Gravity, Urine 1.024      pH, Urine 6.5      Protein, Urine 30 (1+) (*)     Glucose, Urine Normal      Blood, Urine NEGATIVE      Ketones, Urine NEGATIVE      Bilirubin, Urine NEGATIVE      Urobilinogen, Urine 2 (1+) (*)     Nitrite, Urine NEGATIVE      Leukocyte Esterase, Urine 25 Raymundo/µL (*)    SERIAL TROPONIN,  2 HOUR ([facility]) - Abnormal    Troponin T, High Sensitivity 79 (*)    MAGNESIUM - Normal    Magnesium 2.20     SARS-COV-2 AND INFLUENZA A/B PCR - Normal    Flu A Result Not Detected      Flu B Result Not Detected      Coronavirus 2019, PCR Not Detected      Narrative:     This assay has received FDA Emergency Use Authorization (EUA) and  is only authorized for the duration of time that circumstances exist to justify the authorization of the emergency use of in vitro diagnostic tests for the detection of SARS-CoV-2 virus and/or diagnosis of COVID-19 infection under section 564(b)(1) of the Act, 21 U.S.C. 360bbb-3(b)(1). Testing for SARS-CoV-2 is only recommended for patients who meet current clinical and/or epidemiological criteria as defined by federal, state, or local public health directives. This assay is an in vitro diagnostic nucleic acid amplification test for the  qualitative detection of SARS-CoV-2, Influenza A, and Influenza B from nasopharyngeal specimens and has been validated for use at [facility]. Negative results do not preclude COVID-19 infections or Influenza A/B infections, and should not be used as the sole basis for diagnosis, treatment, or other management decisions. If Influenza A/B and RSV PCR results are negative, testing for Parainfluenza virus, Adenovirus and Metapneumovirus is routinely performed for [facility] pediatric oncology and intensive care inpatients, and is available on other patients by placing an add-on request.    LIPASE - Normal    Lipase 32     BLOOD GAS LACTIC ACID, VENOUS - Normal    POCT Lactate, Venous 1.9     BLOOD CULTURE   BLOOD CULTURE   URINE CULTURE   MICROSCOPIC ONLY, URINE    WBC, Urine 1-5      RBC, Urine NONE      Mucus, Urine FEW     TROPONIN T SERIES, HIGH SENSITIVITY (0, 2 HR, 6 HR)    Narrative:     The following orders were created for panel order Troponin T Series, High Sensitivity (0, 2HR, 6HR).  Procedure                               Abnormality         Status                     ---------                               -----------         ------                     Serial Troponin, Initial...[[id number]]  Abnormal            Final result               Serial Troponin, 2 Hour ...[[id number]]  Abnormal            Final result               Serial Troponin, 6 Hour ...[[id number]]                                                   Please view results for these tests on the individual orders.   URINALYSIS WITH REFLEX CULTURE AND MICROSCOPIC    Narrative:     The following orders were created for panel order Urinalysis with Reflex Culture and Microscopic.  Procedure                               Abnormality         Status                     ---------                               -----------         ------                     Urinalysis with Reflex C...[[id number]]  Abnormal            Final result                Extra Urine Gray Tube[[id number]]                                                         Please view results for these tests on the individual orders.   EXTRA URINE GRAY TUBE   SERIAL TROPONIN, 6 HOUR ([facility])       As interpreted by me, the above displayed labs are abnormal. All other labs obtained during this visit were within normal range or not returned as of this dictation.      EKG Interpretation  1147 EKG interpretation: Sinus rhythm with bigeminal PVCs, rate 93.  Leftward axis.  [ML]           XR chest 2 views   Final Result   No active disease in the chest identified.        MACRO:   None        Signed by: Sridhar Duran 4/15/2024 12:13 PM   Dictation workstation:   [id number]      CT head wo IV contrast   Final Result   Age related changes without acute intracranial process.        Signed by: Dusty Hogue 4/15/2024 11:05 AM   Dictation workstation:   [id number]              XR chest 2 views   Final Result   No active disease in the chest identified.        MACRO:   None        Signed by: Sridhar Duran 4/15/2024 12:13 PM   Dictation workstation:   [id number]      CT head wo IV contrast   Final Result   Age related changes without acute intracranial process.        Signed by: Dusty Hogue 4/15/2024 11:05 AM   Dictation workstation:   [id number]              ------------------------------ ED COURSE/MEDICAL DECISION MAKING----------------------  Medical Decision Making:   Exam: A medically appropriate exam performed, outlined above, given the known history and presentation.    History obtained from: Review of medical record nursing notes patient patient presents from home      Social Determinants of Health considered during this visit: Lives at home      PAST MEDICAL HISTORY/Chronic Conditions Affecting Care     has a past medical history of Kidney stones, Personal history of malignant neoplasm, unspecified, Personal history of other diseases of the circulatory system, Personal history of other diseases  of urinary system, and Prostate cancer (Multi).       CC/HPI Summary, Social Determinants of health, Records Reviewed, DDx, testing done/not done, ED Course, Reassessment, disposition considerations/shared decision making with patient, consults, disposition:   Presents with generalized weakness intermittent lightheadedness headache increased weight gain chest pain  Plan  Chest x-ray No active disease in the chest identified.   EKG  CBC  CMP  Magnesium  COVID flu  Troponin  Cardiac monitoring  Pulse ox  Blood cultures  Lipase  Urine  Lactic acid  proBNP   HEART Score: 6   Heart score is a 6   NIH Stroke Scale: 0  IV Lasix  CT head Age related changes without acute intracranial process.   Tylenol  Patient received nitro and aspirin in the squad no chest pain at this time  Patient was found to be hypotensive concerning for sepsis with hypotension however clinically appears to be in fluid overload we will only do a 500 cc bolus and monitor blood pressure closely if additional fluids are needed we will continue      Medical Decision Making/Differential Diagnosis:  Differentials include not limited to viral illness versus pneumonia versus CHF exacerbation versus arrhythmia versus electrode abnormality versus dehydration versus anemia versus acute coronary syndrome versus COVID flu  Review:  COVID-negative  Flu negative  Troponin 90 1 repeat troponin 79  Magnesium 2.2  Lactic acid 1.9  Urine showed leukocytes urobilinogen protein white blood cell count within normal limits urine culture pending  Leukocytes 7.6  Hemoglobin 13.2 no signs of active bleeding  proBNP 4289  Lipase 32  Glucose 111  Electrolytes within normal limits  BUN 19  Creatinine 1.2  LFTs within normal limits  Bili total elevated at 2.1 patient does not appear to be jaundice  Patient presented with generalized weakness left-sided chest pain.  His troponins are elevated at 91 repeat 79.  Chest pain has resolved EKG per attending note sinus rhythm no ST  elevation or arrhythmia noted.  PVCs were noted.  Heart score 6 spoke with his cardiologist who recommended admission patient significant history of cardiomyopathy also anxiety.  proBNP is also elevated history of congestive heart failure patient has been noncompliant with his medication regiment.  Chest x-ray showed no acute active disease identified.  Received IV Lasix electrolytes within normal limits LFTs within normal limits bili total was elevated at 2.1 patient is not jaundiced.  Abdominal assessment benign.  Normal renal function.  Lipase normal.  Urine is not consistent with UTI no elevation white blood cell count patient is not anemic COVID and flu are negative.  Patient also complaining of a generalized headache progressively getting worse comes and goes.  Is not the worst headache of his life.  He did not come on all of a sudden.  No fall or injury.  CT of the head showed age-related changes without acute intracranial process.  NIH is 0 test of skew negative.  Attending physician discussed case with admitting physician.  Agreeable to admit under his service stepdown level of care telemetry  Based on patient's current presentation history and symptoms consistent with acute on chronic congestive heart failure noncompliance with medication, chest pain elevated troponins discussed case with patient's primary care physician is agreed to admit the patient under his service.  With consult to cardiology  Patient seen and evaluated with attending physician Dr. Carreno   PROCEDURES  Unless otherwise noted below, none      CONSULTS:   IP CONSULT TO CARDIOLOGY  IP CONSULT TO HEART FAILURE NAVIGATOR      ED Course as of 04/15/24 1604   Mon Apr 15, 2024   1147 EKG interpretation: Sinus rhythm with bigeminal PVCs, rate 93.  Leftward axis.  [ML]   1218 Spoke with patient's cardiologist Dr. Coats .  Patient significant history of cardiomyopathy.  He is on Entresto already as well as Lasix.  Requested the patient be  admitted under his primary care physician Dr. Parker follows him.  [place] he will see the patient in consult. [TB]   1221 Notified patient's primary care physician Dr. Parmer will call back to discuss patient's care [TB]      ED Course User Index  [ML] Keo Carreno MD  [TB] Gwendolyn Ramirez, APRN-CNP         Diagnoses as of 04/15/24 1604   Acute on chronic heart failure, unspecified heart failure type (Multi)   Chest pain, unspecified type   Elevated troponin         This patient has remained hemodynamically stable during their ED course.      Critical Care: none        Counseling:  The emergency provider has spoken with the patient and discussed today’s results, in addition to providing specific details for the plan of care and counseling regarding the diagnosis and prognosis.  Questions are answered at this time and they are agreeable with the plan.         --------------------------------- IMPRESSION AND DISPOSITION ---------------------------------    IMPRESSION  1. Acute on chronic heart failure, unspecified heart failure type (Multi)    2. Chest pain, unspecified type    3. Elevated troponin        DISPOSITION  Disposition: Admit Dr. Parmer service coverage for patient's primary care physician  Patient condition is stable        NOTE: This report was transcribed using voice recognition software. Every effort was made to ensure accuracy; however, inadvertent computerized transcription errors may be present      ELROY Cote  04/15/24 1602

## 2024-04-15 NOTE — H&P
History Of Present Illness  Eh Saenz is a 78 y.o. male presenting with complaint of shortness of breath.  Patient also complained of leg swelling and weight gain.  Patient also complained of dyspnea on exertion PND orthopnea.  Emergency room initial workup was done and diagnosed with congestive heart evaluated.  Patient has been admitted to the floor for further management.  Patient complained of mild cough but no expectoration.  No fever chills or rigor.  No diarrhea, dysuria, hematuria currently.  No hematemesis hematochezia or melena.  Patient is complaining of chest pain.  Chest pain get worse with the movement.  It is present in the precordial area.  Reproducible pain.  Patient also complained of t retrosternal pain.     Past Medical History  Past Medical History:   Diagnosis Date    Kidney stones     Personal history of malignant neoplasm, unspecified     History of malignant neoplasm    Personal history of other diseases of the circulatory system     History of hypertension    Personal history of other diseases of urinary system     History of kidney disease    Prostate cancer (Multi)        Surgical History  Past Surgical History:   Procedure Laterality Date    CATARACT EXTRACTION      OTHER SURGICAL HISTORY  10/18/2021    Kidney surgery    OTHER SURGICAL HISTORY  10/18/2021    Back surgery        Social History  He reports that he has never smoked. He has never used smokeless tobacco. No history on file for alcohol use and drug use.    Family History  No family history on file.     Allergies  Bactrim [sulfamethoxazole-trimethoprim], Ciprofloxacin, Dicyclomine, Meloxicam, and Rosuvastatin    Review of Systems  I reviewed all systems reviewed as above otherwise is negative.  Physical Exam  HEENT:  Head externally atraumatic, no pallor, no icterus, extraocular movements intact, pupils reactive to light, oral mucosa moist and throat clear.  Neck:  Supple, AP elevated, no palpable adenopathy or  thyromegaly.  No carotid bruit.  Chest:  Clear to auscultation and resonant.  Heart:  Regular rate and rhythm, no murmur or gallop could be appreciated.  Abdomen:  Soft, nontender, bowel sounds present, normoactive, no palpable hepatosplenomegaly.  Extremities: Bilateral leg edema has present, pulses present, no cyanosis or clubbing.  CNS:  Patient alert, oriented to time, place and person.  Power 5/5 all over and deep tendon reflexes symmetrical, cranial nerves 2-12 grossly intact.  Skin:  No active rash.  Musculoskeletal:  No joint swelling or erythema, range of movement normal.  Last Recorded Vitals  Heart Rate:  [89-97]   Temp:  [36.6 °C (97.8 °F)-37 °C (98.6 °F)]   Resp:  [15-23]   BP: (104-134)/(35-79)   Weight:  [108 kg (238 lb 4.8 oz)]   SpO2:  [95 %-98 %]       Relevant Results        Results for orders placed or performed during the hospital encounter of 04/15/24 (from the past 24 hour(s))   CBC and Auto Differential   Result Value Ref Range    WBC 7.6 4.4 - 11.3 x10*3/uL    nRBC 0.0 0.0 - 0.0 /100 WBCs    RBC 4.48 (L) 4.50 - 5.90 x10*6/uL    Hemoglobin 13.2 (L) 13.5 - 17.5 g/dL    Hematocrit 42.2 41.0 - 52.0 %    MCV 94 80 - 100 fL    MCH 29.5 26.0 - 34.0 pg    MCHC 31.3 (L) 32.0 - 36.0 g/dL    RDW 14.7 (H) 11.5 - 14.5 %    Platelets 194 150 - 450 x10*3/uL    Neutrophils % 74.1 40.0 - 80.0 %    Immature Granulocytes %, Automated 0.4 0.0 - 0.9 %    Lymphocytes % 10.1 13.0 - 44.0 %    Monocytes % 13.7 2.0 - 10.0 %    Eosinophils % 1.3 0.0 - 6.0 %    Basophils % 0.4 0.0 - 2.0 %    Neutrophils Absolute 5.66 (H) 1.60 - 5.50 x10*3/uL    Immature Granulocytes Absolute, Automated 0.03 0.00 - 0.50 x10*3/uL    Lymphocytes Absolute 0.77 (L) 0.80 - 3.00 x10*3/uL    Monocytes Absolute 1.05 (H) 0.05 - 0.80 x10*3/uL    Eosinophils Absolute 0.10 0.00 - 0.40 x10*3/uL    Basophils Absolute 0.03 0.00 - 0.10 x10*3/uL   Comprehensive Metabolic Panel   Result Value Ref Range    Glucose 111 (H) 65 - 99 mg/dL    Sodium 138 133 -  145 mmol/L    Potassium 5.0 3.4 - 5.1 mmol/L    Chloride 102 97 - 107 mmol/L    Bicarbonate 27 24 - 31 mmol/L    Urea Nitrogen 19 8 - 25 mg/dL    Creatinine 1.20 0.40 - 1.60 mg/dL    eGFR 61 >60 mL/min/1.73m*2    Calcium 9.8 8.5 - 10.4 mg/dL    Albumin 3.9 3.5 - 5.0 g/dL    Alkaline Phosphatase 67 35 - 125 U/L    Total Protein 6.6 5.9 - 7.9 g/dL    AST 19 5 - 40 U/L    Bilirubin, Total 2.1 (H) 0.1 - 1.2 mg/dL    ALT 18 5 - 40 U/L    Anion Gap 9 <=19 mmol/L   Magnesium   Result Value Ref Range    Magnesium 2.20 1.60 - 3.10 mg/dL   NT Pro-BNP   Result Value Ref Range    PROBNP 4,289 (H) 0 - 852 pg/mL   Serial Troponin, Initial ([facility])   Result Value Ref Range    Troponin T, High Sensitivity 91 (HH) <=14 ng/L   Lipase   Result Value Ref Range    Lipase 32 16 - 63 U/L   BLOOD GAS LACTIC ACID, VENOUS   Result Value Ref Range    POCT Lactate, Venous 1.9 0.4 - 2.0 mmol/L   Sars-CoV-2 and Influenza A/B PCR   Result Value Ref Range    Flu A Result Not Detected Not Detected    Flu B Result Not Detected Not Detected    Coronavirus 2019, PCR Not Detected Not Detected   Urinalysis with Reflex Culture and Microscopic   Result Value Ref Range    Color, Urine Yellow Light-Yellow, Yellow, Dark-Yellow    Appearance, Urine Clear Clear    Specific Gravity, Urine 1.024 1.005 - 1.035    pH, Urine 6.5 5.0, 5.5, 6.0, 6.5, 7.0, 7.5, 8.0    Protein, Urine 30 (1+) (A) NEGATIVE, 10 (TRACE), 20 (TRACE) mg/dL    Glucose, Urine Normal Normal mg/dL    Blood, Urine NEGATIVE NEGATIVE    Ketones, Urine NEGATIVE NEGATIVE mg/dL    Bilirubin, Urine NEGATIVE NEGATIVE    Urobilinogen, Urine 2 (1+) (A) Normal mg/dL    Nitrite, Urine NEGATIVE NEGATIVE    Leukocyte Esterase, Urine 25 Raymundo/µL (A) NEGATIVE   Microscopic Only, Urine   Result Value Ref Range    WBC, Urine 1-5 1-5, NONE /HPF    RBC, Urine NONE NONE, 1-2, 3-5 /HPF    Mucus, Urine FEW Reference range not established. /LPF   Serial Troponin, 2 Hour ([facility])   Result Value Ref Range    Troponin T, High  Sensitivity 79 () <=14 ng/L   Serial Troponin, 6 Hour ([facility])   Result Value Ref Range    Troponin T, High Sensitivity 72 (HH) <=14 ng/L     Prior to Admission medications    Medication Sig Start Date End Date Taking? Authorizing Provider   aspirin 81 mg EC tablet Take 1 tablet (81 mg) by mouth once daily.   Yes Historical Provider, MD   atorvastatin calcium (ATORVASTATIN ORAL) Take 1 tablet by mouth once daily at bedtime.   Yes Historical Provider, MD   cholecalciferol (Vitamin D-3) 25 MCG (1000 UT) capsule Take 1 capsule (25 mcg) by mouth once daily.   Yes Historical Provider, MD   furosemide (Lasix) 40 mg tablet Take 1 tablet (40 mg) by mouth once daily.   Yes Historical Provider, MD   multivitamin tablet Take 1 tablet by mouth once daily.   Yes Historical Provider, MD   potassium chloride CR 20 mEq ER tablet Take 1 tablet (20 mEq) by mouth once daily. Do not crush or chew.   Yes Historical Provider, MD   sacubitril/valsartan (ENTRESTO ORAL) Take 0.5 tablets by mouth 2 times a day.   Yes Historical Provider, MD   aspirin 81 mg EC tablet Take by mouth.  4/15/24  Historical Provider, MD       Current Facility-Administered Medications:     acetaminophen (Tylenol) tablet 650 mg, 650 mg, oral, q6h PRN **OR** acetaminophen (Tylenol) oral liquid 650 mg, 650 mg, nasogastric tube, q6h PRN **OR** acetaminophen (Tylenol) suppository 650 mg, 650 mg, rectal, q6h PRN, Thomas Parker MD    aspirin EC tablet 81 mg, 81 mg, oral, Daily, Thomas Parker MD    atorvastatin (Lipitor) tablet 40 mg, 40 mg, oral, Nightly, Thomas Parker MD    benzocaine-menthol (Cepastat Sore Throat) 15-3.6 mg lozenge 1 lozenge, 1 lozenge, Mouth/Throat, q2h PRN, Thomas Parker MD    cholecalciferol (Vitamin D-3) capsule 125 mcg, 5,000 Units, oral, Daily, Thomas Parker MD    dextromethorphan-guaifenesin (Robitussin DM)  mg/5 mL oral liquid 5 mL, 5 mL, oral, q4h PRN, Thomas Parker MD    furosemide (Lasix) injection 40  mg, 40 mg, intravenous, q8h, Thomas Parker MD    [START ON 4/16/2024] furosemide (Lasix) tablet 40 mg, 40 mg, oral, Daily, Thomas Parker MD    guaiFENesin (Mucinex) 12 hr tablet 600 mg, 600 mg, oral, q12h PRN, Thomas Parker MD    heparin (porcine) injection 5,000 Units, 5,000 Units, subcutaneous, q8h VERA, Thomas Parker MD    magnesium sulfate IV 2 g, 2 g, intravenous, Once, Thomas Parker MD    multivitamin 1 tablet, 1 tablet, oral, Daily, Thomas Parker MD    oxygen (O2) therapy, , inhalation, Continuous PRN - O2/gases, Thomas Parker MD    polyethylene glycol (Glycolax, Miralax) packet 17 g, 17 g, oral, Daily PRN, Thomas Parker MD    potassium chloride CR (Klor-Con M20) ER tablet 20 mEq, 20 mEq, oral, Daily, Thomas Parker MD    [START ON 4/16/2024] psyllium (Metamucil) 3.4 gram packet 1 packet, 1 packet, oral, Daily, Thomas Parker MD    sacubitriL-valsartan (Entresto) 24-26 mg per tablet 1 tablet, 1 tablet, oral, BID, Thomas Parker MD  XR chest 2 views    Result Date: 4/15/2024  Interpreted By:  Sridhar Duran, STUDY: XR CHEST 2 VIEWS;  4/15/2024 11:50 am   INDICATION: Signs/Symptoms:Chest Pain.   COMPARISON: 06/16/2023   ACCESSION NUMBER(S): [id number]   ORDERING CLINICIAN: NICHOLE MAY   FINDINGS: The lungs appear clear without apparent pleural effusion. Suspect cardiomegaly. No congestive failure.       No active disease in the chest identified.   MACRO: None   Signed by: Sridhar Duran 4/15/2024 12:13 PM Dictation workstation:   [id number]    CT head wo IV contrast    Result Date: 4/15/2024  Interpreted By:  Dusty Hogue, STUDY: CT HEAD WO IV CONTRAST; 4/15/2024 10:54 am   INDICATION: Signs/Symptoms:headache   COMPARISON: August 2020   ACCESSION NUMBER(S): [id number]   ORDERING CLINICIAN: NICHOLE MAY   TECHNIQUE: Axial images were obtained through the brain. No IV contrast was administered.   All CT examinations are performed with one or more  of the following dose reduction techniques: Automated Exposure Control, adjustment of mA and/or kV according to patient size, or use of iterative reconstruction techniques.   FINDINGS: The ventricles are enlarged but midline in position, with proportional prominence of the sulci, due to atrophy. Patchy periventricular hypodensities are present suggestive of small vessel ischemic white matter disease. There is no intracranial hemorrhage. No mass or mass effect is seen. The osseus structures are unremarkable.       Age related changes without acute intracranial process.   Signed by: Dusty Hogue 4/15/2024 11:05 AM Dictation workstation:   [id number]    No results found for the last 90 days.       Assessment/Plan   Principal Problem:    Acute on chronic heart failure, unspecified heart failure type (Multi)  Active Problems:    Elevated troponin    Chest pain  COPD  Congestive heart failure  History of prostate cancer  Continue current medication get supportive care.  Physical therapy Occupational appendectomy.  Monitor CBC and BMP increase activity.  Monitor input output and daily weight.  Patient was started on the IV diuretics.  Monitor electrolytes and replete as needed.  Patient to UTI.  Status post antibiotics.  Check urine culture continue antibiotics.  Troponins are elevated but trended flat.  He has cardiology consulted and obtained.  We will take DVT, fall, aspiration, decubitus and DVT precautions.      Thomas Parker MD

## 2024-04-16 ENCOUNTER — APPOINTMENT (OUTPATIENT)
Dept: CARDIOLOGY | Facility: HOSPITAL | Age: 79
End: 2024-04-16
Payer: MEDICARE

## 2024-04-16 LAB
ALBUMIN SERPL-MCNC: 3.6 G/DL (ref 3.5–5)
ALP BLD-CCNC: 62 U/L (ref 35–125)
ALT SERPL-CCNC: 15 U/L (ref 5–40)
ANION GAP SERPL CALC-SCNC: 11 MMOL/L
AST SERPL-CCNC: 16 U/L (ref 5–40)
BACTERIA UR CULT: NORMAL
BILIRUB SERPL-MCNC: 1.9 MG/DL (ref 0.1–1.2)
BUN SERPL-MCNC: 20 MG/DL (ref 8–25)
CALCIUM SERPL-MCNC: 9.6 MG/DL (ref 8.5–10.4)
CHLORIDE SERPL-SCNC: 102 MMOL/L (ref 97–107)
CO2 SERPL-SCNC: 26 MMOL/L (ref 24–31)
CREAT SERPL-MCNC: 1.2 MG/DL (ref 0.4–1.6)
EGFRCR SERPLBLD CKD-EPI 2021: 61 ML/MIN/1.73M*2
ERYTHROCYTE [DISTWIDTH] IN BLOOD BY AUTOMATED COUNT: 14.7 % (ref 11.5–14.5)
GLUCOSE SERPL-MCNC: 97 MG/DL (ref 65–99)
HCT VFR BLD AUTO: 39.4 % (ref 41–52)
HGB BLD-MCNC: 12.7 G/DL (ref 13.5–17.5)
MCH RBC QN AUTO: 29.5 PG (ref 26–34)
MCHC RBC AUTO-ENTMCNC: 32.2 G/DL (ref 32–36)
MCV RBC AUTO: 92 FL (ref 80–100)
NRBC BLD-RTO: 0 /100 WBCS (ref 0–0)
PLATELET # BLD AUTO: 172 X10*3/UL (ref 150–450)
POTASSIUM SERPL-SCNC: 4.1 MMOL/L (ref 3.4–5.1)
PROT SERPL-MCNC: 6.1 G/DL (ref 5.9–7.9)
RBC # BLD AUTO: 4.3 X10*6/UL (ref 4.5–5.9)
SODIUM SERPL-SCNC: 139 MMOL/L (ref 133–145)
TSH SERPL DL<=0.05 MIU/L-ACNC: 2.05 MIU/L (ref 0.27–4.2)
WBC # BLD AUTO: 7.6 X10*3/UL (ref 4.4–11.3)

## 2024-04-16 PROCEDURE — 2500000004 HC RX 250 GENERAL PHARMACY W/ HCPCS (ALT 636 FOR OP/ED): Performed by: INTERNAL MEDICINE

## 2024-04-16 PROCEDURE — 80053 COMPREHEN METABOLIC PANEL: CPT | Performed by: INTERNAL MEDICINE

## 2024-04-16 PROCEDURE — 96376 TX/PRO/DX INJ SAME DRUG ADON: CPT | Mod: 59

## 2024-04-16 PROCEDURE — 93306 TTE W/DOPPLER COMPLETE: CPT

## 2024-04-16 PROCEDURE — G0378 HOSPITAL OBSERVATION PER HR: HCPCS

## 2024-04-16 PROCEDURE — 96372 THER/PROPH/DIAG INJ SC/IM: CPT | Performed by: INTERNAL MEDICINE

## 2024-04-16 PROCEDURE — 85027 COMPLETE CBC AUTOMATED: CPT | Performed by: INTERNAL MEDICINE

## 2024-04-16 PROCEDURE — 84443 ASSAY THYROID STIM HORMONE: CPT | Performed by: INTERNAL MEDICINE

## 2024-04-16 PROCEDURE — 97161 PT EVAL LOW COMPLEX 20 MIN: CPT | Mod: GP

## 2024-04-16 PROCEDURE — 2500000002 HC RX 250 W HCPCS SELF ADMINISTERED DRUGS (ALT 637 FOR MEDICARE OP, ALT 636 FOR OP/ED): Mod: MUE | Performed by: INTERNAL MEDICINE

## 2024-04-16 PROCEDURE — 2500000001 HC RX 250 WO HCPCS SELF ADMINISTERED DRUGS (ALT 637 FOR MEDICARE OP): Performed by: INTERNAL MEDICINE

## 2024-04-16 PROCEDURE — 93005 ELECTROCARDIOGRAM TRACING: CPT

## 2024-04-16 PROCEDURE — 36415 COLL VENOUS BLD VENIPUNCTURE: CPT | Performed by: INTERNAL MEDICINE

## 2024-04-16 RX ADMIN — HEPARIN SODIUM 5000 UNITS: 5000 INJECTION, SOLUTION INTRAVENOUS; SUBCUTANEOUS at 13:58

## 2024-04-16 RX ADMIN — HEPARIN SODIUM 5000 UNITS: 5000 INJECTION, SOLUTION INTRAVENOUS; SUBCUTANEOUS at 06:09

## 2024-04-16 RX ADMIN — PSYLLIUM HUSK 1 PACKET: 3.4 POWDER ORAL at 15:58

## 2024-04-16 RX ADMIN — FUROSEMIDE 40 MG: 10 INJECTION, SOLUTION INTRAMUSCULAR; INTRAVENOUS at 13:58

## 2024-04-16 RX ADMIN — MULTIVITAMIN TABLET 1 TABLET: TABLET at 09:27

## 2024-04-16 RX ADMIN — ATORVASTATIN CALCIUM 40 MG: 40 TABLET, FILM COATED ORAL at 20:41

## 2024-04-16 RX ADMIN — POTASSIUM CHLORIDE 20 MEQ: 1500 TABLET, EXTENDED RELEASE ORAL at 09:28

## 2024-04-16 RX ADMIN — HEPARIN SODIUM 5000 UNITS: 5000 INJECTION, SOLUTION INTRAVENOUS; SUBCUTANEOUS at 22:00

## 2024-04-16 RX ADMIN — Medication 125 MCG: at 09:27

## 2024-04-16 RX ADMIN — ASPIRIN 81 MG: 81 TABLET, COATED ORAL at 09:27

## 2024-04-16 SDOH — SOCIAL STABILITY: SOCIAL NETWORK
DO YOU BELONG TO ANY CLUBS OR ORGANIZATIONS SUCH AS CHURCH GROUPS UNIONS, FRATERNAL OR ATHLETIC GROUPS, OR SCHOOL GROUPS?: NO

## 2024-04-16 SDOH — SOCIAL STABILITY: SOCIAL NETWORK: HOW OFTEN DO YOU GET TOGETHER WITH FRIENDS OR RELATIVES?: ONCE A WEEK

## 2024-04-16 SDOH — HEALTH STABILITY: MENTAL HEALTH: HOW MANY STANDARD DRINKS CONTAINING ALCOHOL DO YOU HAVE ON A TYPICAL DAY?: PATIENT DOES NOT DRINK

## 2024-04-16 SDOH — SOCIAL STABILITY: SOCIAL INSECURITY: WITHIN THE LAST YEAR, HAVE YOU BEEN HUMILIATED OR EMOTIONALLY ABUSED IN OTHER WAYS BY YOUR PARTNER OR EX-PARTNER?: NO

## 2024-04-16 SDOH — ECONOMIC STABILITY: FOOD INSECURITY: WITHIN THE PAST 12 MONTHS, YOU WORRIED THAT YOUR FOOD WOULD RUN OUT BEFORE YOU GOT MONEY TO BUY MORE.: NEVER TRUE

## 2024-04-16 SDOH — ECONOMIC STABILITY: HOUSING INSECURITY
IN THE LAST 12 MONTHS, WAS THERE A TIME WHEN YOU DID NOT HAVE A STEADY PLACE TO SLEEP OR SLEPT IN A SHELTER (INCLUDING NOW)?: NO

## 2024-04-16 SDOH — SOCIAL STABILITY: SOCIAL NETWORK: HOW OFTEN DO YOU ATTENT MEETINGS OF THE CLUB OR ORGANIZATION YOU BELONG TO?: NEVER

## 2024-04-16 SDOH — HEALTH STABILITY: MENTAL HEALTH
HOW OFTEN DO YOU NEED TO HAVE SOMEONE HELP YOU WHEN YOU READ INSTRUCTIONS, PAMPHLETS, OR OTHER WRITTEN MATERIAL FROM YOUR DOCTOR OR PHARMACY?: NEVER

## 2024-04-16 SDOH — HEALTH STABILITY: MENTAL HEALTH: HOW OFTEN DO YOU HAVE 6 OR MORE DRINKS ON ONE OCCASION?: NEVER

## 2024-04-16 SDOH — SOCIAL STABILITY: SOCIAL INSECURITY: WITHIN THE LAST YEAR, HAVE YOU BEEN AFRAID OF YOUR PARTNER OR EX-PARTNER?: NO

## 2024-04-16 SDOH — HEALTH STABILITY: PHYSICAL HEALTH: ON AVERAGE, HOW MANY DAYS PER WEEK DO YOU ENGAGE IN MODERATE TO STRENUOUS EXERCISE (LIKE A BRISK WALK)?: 0 DAYS

## 2024-04-16 SDOH — ECONOMIC STABILITY: INCOME INSECURITY: IN THE LAST 12 MONTHS, WAS THERE A TIME WHEN YOU WERE NOT ABLE TO PAY THE MORTGAGE OR RENT ON TIME?: NO

## 2024-04-16 SDOH — SOCIAL STABILITY: SOCIAL NETWORK: ARE YOU MARRIED, WIDOWED, DIVORCED, SEPARATED, NEVER MARRIED, OR LIVING WITH A PARTNER?: DIVORCED

## 2024-04-16 SDOH — SOCIAL STABILITY: SOCIAL INSECURITY
WITHIN THE LAST YEAR, HAVE YOU BEEN KICKED, HIT, SLAPPED, OR OTHERWISE PHYSICALLY HURT BY YOUR PARTNER OR EX-PARTNER?: NO

## 2024-04-16 SDOH — ECONOMIC STABILITY: TRANSPORTATION INSECURITY
IN THE PAST 12 MONTHS, HAS THE LACK OF TRANSPORTATION KEPT YOU FROM MEDICAL APPOINTMENTS OR FROM GETTING MEDICATIONS?: NO

## 2024-04-16 SDOH — SOCIAL STABILITY: SOCIAL INSECURITY
WITHIN THE LAST YEAR, HAVE TO BEEN RAPED OR FORCED TO HAVE ANY KIND OF SEXUAL ACTIVITY BY YOUR PARTNER OR EX-PARTNER?: NO

## 2024-04-16 SDOH — SOCIAL STABILITY: SOCIAL NETWORK: IN A TYPICAL WEEK, HOW MANY TIMES DO YOU TALK ON THE PHONE WITH FAMILY, FRIENDS, OR NEIGHBORS?: ONCE A WEEK

## 2024-04-16 SDOH — ECONOMIC STABILITY: INCOME INSECURITY: HOW HARD IS IT FOR YOU TO PAY FOR THE VERY BASICS LIKE FOOD, HOUSING, MEDICAL CARE, AND HEATING?: NOT HARD AT ALL

## 2024-04-16 SDOH — ECONOMIC STABILITY: FOOD INSECURITY: WITHIN THE PAST 12 MONTHS, THE FOOD YOU BOUGHT JUST DIDN'T LAST AND YOU DIDN'T HAVE MONEY TO GET MORE.: NEVER TRUE

## 2024-04-16 SDOH — ECONOMIC STABILITY: INCOME INSECURITY: IN THE PAST 12 MONTHS, HAS THE ELECTRIC, GAS, OIL, OR WATER COMPANY THREATENED TO SHUT OFF SERVICE IN YOUR HOME?: NO

## 2024-04-16 SDOH — HEALTH STABILITY: PHYSICAL HEALTH: ON AVERAGE, HOW MANY MINUTES DO YOU ENGAGE IN EXERCISE AT THIS LEVEL?: 0 MIN

## 2024-04-16 SDOH — SOCIAL STABILITY: SOCIAL NETWORK: HOW OFTEN DO YOU ATTEND CHURCH OR RELIGIOUS SERVICES?: NEVER

## 2024-04-16 SDOH — ECONOMIC STABILITY: TRANSPORTATION INSECURITY
IN THE PAST 12 MONTHS, HAS LACK OF TRANSPORTATION KEPT YOU FROM MEETINGS, WORK, OR FROM GETTING THINGS NEEDED FOR DAILY LIVING?: NO

## 2024-04-16 SDOH — HEALTH STABILITY: MENTAL HEALTH: HOW OFTEN DO YOU HAVE A DRINK CONTAINING ALCOHOL?: NEVER

## 2024-04-16 SDOH — HEALTH STABILITY: MENTAL HEALTH
STRESS IS WHEN SOMEONE FEELS TENSE, NERVOUS, ANXIOUS, OR CAN'T SLEEP AT NIGHT BECAUSE THEIR MIND IS TROUBLED. HOW STRESSED ARE YOU?: NOT AT ALL

## 2024-04-16 ASSESSMENT — COGNITIVE AND FUNCTIONAL STATUS - GENERAL
DAILY ACTIVITIY SCORE: 24
DAILY ACTIVITIY SCORE: 24
MOBILITY SCORE: 23
CLIMB 3 TO 5 STEPS WITH RAILING: A LITTLE
MOBILITY SCORE: 24
CLIMB 3 TO 5 STEPS WITH RAILING: A LITTLE
PATIENT BASELINE BEDBOUND: NO

## 2024-04-16 ASSESSMENT — LIFESTYLE VARIABLES
AUDIT-C TOTAL SCORE: 0
SKIP TO QUESTIONS 9-10: 1

## 2024-04-16 ASSESSMENT — PAIN - FUNCTIONAL ASSESSMENT
PAIN_FUNCTIONAL_ASSESSMENT: 0-10

## 2024-04-16 ASSESSMENT — PAIN SCALES - GENERAL
PAINLEVEL_OUTOF10: 0 - NO PAIN
PAINLEVEL_OUTOF10: 0 - NO PAIN
PAINLEVEL_OUTOF10: 1
PAINLEVEL_OUTOF10: 0 - NO PAIN

## 2024-04-16 ASSESSMENT — ACTIVITIES OF DAILY LIVING (ADL)
ADLS_ADDRESSED: NO
LACK_OF_TRANSPORTATION: NO
ADL_ASSISTANCE: INDEPENDENT
LACK_OF_TRANSPORTATION: NO

## 2024-04-16 ASSESSMENT — PAIN DESCRIPTION - DESCRIPTORS: DESCRIPTORS: ACHING

## 2024-04-16 NOTE — PROGRESS NOTES
"Physical Therapy    Physical Therapy Evaluation    Patient Name: Eh Saenz  MRN: [id number]  Today's Date: 4/16/2024   Time Calculation  Start Time: 0738  Stop Time: 0754  Time Calculation (min): 16 min  Documentation and services provided by student physical therapist while supervised under a licensed physical therapist.   Puneet Ramirez, SPT      Assessment/Plan   PT Assessment  PT Assessment Results: Decreased endurance  Medical Staff Made Aware: Yes  End of Session Communication: Bedside nurse  End of Session Patient Position: Bed, 3 rail up, Alarm off, not on at start of session    Assessment Comment: 79 y/o male presents secondary to CHF, chest pain, and headache. Pt demonstrates good safety awareness, independence with functional mobility. Pt appears at/near functional baseline with no further needs for skilled PT while in house. Pt denies physical concerns about home environment and ability to enter home.    IP OR SWING BED PT PLAN  Inpatient or Swing Bed: Inpatient  PT Plan  PT Plan: PT Eval only  PT Eval Only Reason: No acute PT needs identified  PT Frequency: PT eval only  PT Discharge Recommendations: Low intensity level of continued care  PT Recommended Transfer Status: Independent  PT - OK to Discharge: Yes      Subjective   General Visit Information:  General  Reason for Referral: Impaired mobility  Past Medical History Relevant to Rehab: PMH to include but not limited to; CHF, prostate cancer  Missed Visit: No  Family/Caregiver Present: No  Prior to Session Communication: Bedside nurse  Patient Position Received: Bed, 3 rail up, Alarm off, not on at start of session  Preferred Learning Style: verbal  General Comment: Pt presents sleeping supine in bed with HOB slightly elvated. Pt required encouragement to participate in therapy but mentions that he usually doesn't get up till \"9:30.\" Pt denies wanting to order breakfast when room service called pt to order breakfast. Tele intact but not on IV " "or sup O2. Pt reports he was up and going to the bathroom \"at least ten times\" last night reporting due ot lasix.  Home Living:  Home Living  Type of Home: House  Lives With: Alone (Pt did not specify when asked about home set-up)  Home Adaptive Equipment: None  Home Layout: Two level (with basement but \"very seldom\" goes to basement. Often goes to second floor.)  Home Access: Stairs to enter without rails (reports he grabs onto the door frame)  Entrance Stairs-Rails: None  Entrance Stairs-Number of Steps: 14 steps to second floor (has a handrail for stairs to second floor)  Prior Level of Function:  Prior Function Per Pt/Caregiver Report  Level of [place]: Independent with homemaking with ambulation, Independent with ADLs and functional transfers  ADL Assistance: Independent  Precautions:  Precautions  Medical Precautions: Fall precautions  Vital Signs:  Vital Signs  Heart Rate: 77 (after walking in hallway)  SpO2: 94 % (after walking in hallway)    Objective   Pain:  Pain Assessment  Pain Assessment: 0-10  Pain Score: 1 (1-2 in the knees and reports a mild headache.)  Pain Location: Head  Pain Descriptors: Aching  Cognition:  Cognition  Overall Cognitive Status: Within Functional Limits    General Assessments:  General Observation  General Observation: Pt appears aggitated and irritable requiring encouragement from therapy.     Activity Tolerance  Endurance: Decreased tolerance for upright activites (SOB reported when walking in hallway)  Activity Tolerance Comments: Pt able to perform functional mobility at SUP or less but had limited endurance when walking in hallway.    Sensation  Light Touch: No apparent deficits    Strength  Strength Comments: no functional strength deficits observed with mobility  Postural Control  Postural Control: Impaired  Posture Comment: forward head and rounded shoulders.    Static Sitting Balance  Static Sitting-Balance Support: Feet supported  Static Sitting-Level of Assistance: " Modified independent  Static Sitting-Comment/Number of Minutes: sitting EOB  Dynamic Sitting Balance  Dynamic Sitting-Balance Support: Feet supported, Right upper extremity supported (Forearm support with forward and lateral lean to reach for glasses on bedside table.)  Dynamic Sitting-Balance: Lateral lean, Forward lean  Dynamic Sitting-Comments: dist sup/ Mod Ind    Static Standing Balance  Static Standing-Balance Support: No upper extremity supported  Static Standing-Level of Assistance: Close supervision  Static Standing-Comment/Number of Minutes: standing at bedside  Dynamic Standing Balance  Dynamic Standing-Balance Support: No upper extremity supported  Dynamic Standing-Balance: Forward lean  Dynamic Standing-Comments: close sup/CGA with forward lean to touch clinicians hand out in front of patient.  Able to complete 180 degree turn and maneuver around obstacles without LOB.  Functional Assessments:  ADL  ADL's Addressed: No    Bed Mobility  Bed Mobility: Yes  Bed Mobility 1  Bed Mobility 1: Supine to sitting  Level of Assistance 1: Modified independent  Bed Mobility Comments 1: HOB elevated and use of bedside rail on R.  Bed Mobility 2  Bed Mobility  2: Sitting to supine  Level of Assistance 2: Modified independent  Bed Mobility Comments 2: HOB elevated and use of bedside rail on R.    Transfers  Transfer: Yes  Transfer 1  Transfer From 1: Sit to  Transfer to 1: Stand  Technique 1: Sit to stand  Transfer Level of Assistance 1: Distant supervision  Trials/Comments 1: no A.D needed. able to push from bed (Pt reportedly up ad cory within the room, to/from bathroom.)  Transfers 2  Transfer From 2: Stand to  Transfer to 2: Sit  Technique 2: Stand to sit  Transfer Level of Assistance 2: Distant supervision  Trials/Comments 2: stand to sitting EOB    Ambulation/Gait Training  Ambulation/Gait Training Performed: Yes  Ambulation/Gait Training 1  Surface 1: Level tile  Device 1: No device  Assistance 1: Close  "supervision  Quality of Gait 1:  (Difficulty having full knee extension with ambulation reported R knee is \"bone on bone.\")  Comments/Distance (ft) 1: x 40 feet at close SUP w/o A.D. needs for stability. No LOB. No observed major gait deviations during ambulation. Reported SOB once returned to seated position.    Stairs  Stairs: No (Pt denying any concerns entering into home)    General Observation:  Pt appears aggitated and irritable requiring encouragement from therapy.     Extremity/Trunk Assessments:  RLE   RLE : Within Functional Limits  LLE   LLE : Within Functional Limits  Outcome Measures:  [facility] Basic Mobility  Turning from your back to your side while in a flat bed without using bedrails: None  Moving from lying on your back to sitting on the side of a flat bed without using bedrails: None  Moving to and from bed to chair (including a wheelchair): None  Standing up from a chair using your arms (e.g. wheelchair or bedside chair): None  To walk in hospital room: None  Climbing 3-5 steps with railing: A little  Basic Mobility - Total Score: 23        Education Documentation  Precautions, taught by HEATH Pascual at 4/16/2024  8:49 AM.  Learner: Patient  Readiness: Acceptance  Method: Explanation  Response: Verbalizes Understanding    Body Mechanics, taught by HEATH Pascual at 4/16/2024  8:49 AM.  Learner: Patient  Readiness: Acceptance  Method: Explanation  Response: Verbalizes Understanding    Mobility Training, taught by HEATH Pascual at 4/16/2024  8:49 AM.  Learner: Patient  Readiness: Acceptance  Method: Explanation  Response: Verbalizes Understanding    Education Comments  No comments found.            "

## 2024-04-16 NOTE — CARE PLAN
The patient's goals for the shift include get some rest    The clinical goals for the shift include monitor input and output      Problem: Safety  Goal: Patient will be injury free during hospitalization  Outcome: Progressing     Problem: Safety  Goal: I will remain free of falls  Outcome: Progressing     Problem: Daily Care  Goal: Daily care needs are met  Outcome: Progressing     Problem: Psychosocial Needs  Goal: Demonstrates ability to cope with hospitalization/illness  Outcome: Progressing

## 2024-04-16 NOTE — CONSULTS
Nutrition Assessement Note    Nutrition Assessment    Reason for Assessment: Provider consult order    Reason for Hospital Admission:  Eh Saenz is a 78 y.o. male who is admitted for CHF.     Nutrition History:  Food and Nutrient History: decreased appetite at home for 1-2 weeks which pt attributes to his recent fluid gains. reports only adding salt to french fries. eats canned soup and frozen meals at home. reports eating out twice a week. reports living alone and does not cook much for himself.  Energy Intake: Good > 75 %  GI Symptoms: Constipation     Anthropometrics:  Ht: 70 inches, Wt: 103 kg (227 lb), BMI: 32.57    Weight Change:  Daily Weight  04/16/24 : 103 kg (227 lb)  11/22/23 : 107 kg (236 lb)  05/10/23 : 107 kg (236 lb)  12/08/22 : 104 kg (230 lb)  07/21/22 : 105 kg (232 lb)  12/23/21 : 106 kg (234 lb)  10/18/21 : 103 kg (228 lb)  09/02/21 : 104 kg (229 lb)  06/28/21 : 107 kg (236 lb 6.4 oz)  04/22/21 : 108 kg (239 lb)     Weight History / % Weight Change: recent wt gain d/t fluid. reprots usual wt of 230#. bedscale wt of 227# at this visit.    Nutrition Focused Physical Exam Findings:   Subcutaneous Fat Loss  Orbital Fat Pads: Well nourished (slightly bulging fat pads)  Buccal Fat Pads: Well nourished (full, rounded cheeks)    Muscle Wasting  Temporalis: Well nourished (well-defined muscle)  Pectoralis (Clavicular Region): Well nourished (clavicle not visible)  Deltoid/Trapezius: Well nourished (rounded appearance at arm, shoulder, neck)    Edema  Edema: +2 mild  Edema Location: BLE    Nutrition Significant Labs:  Lab Results   Component Value Date    WBC 7.6 04/16/2024    HGB 12.7 (L) 04/16/2024    HCT 39.4 (L) 04/16/2024     04/16/2024    CHOL 157 08/28/2022    TRIG 97 08/28/2022    HDL 33 (L) 08/28/2022    ALT 15 04/16/2024    AST 16 04/16/2024     04/16/2024    K 4.1 04/16/2024     04/16/2024    CREATININE 1.20 04/16/2024    BUN 20 04/16/2024    CO2 26 04/16/2024    TSH  2.05 04/16/2024    INR 1.1 08/28/2022    HGBA1C 6.0 08/12/2020     Nutrition Specific Medications:  aspirin, 81 mg, oral, Daily  atorvastatin, 40 mg, oral, Nightly  cholecalciferol, 5,000 Units, oral, Daily  furosemide, 40 mg, intravenous, q8h  furosemide, 40 mg, oral, Daily  heparin (porcine), 5,000 Units, subcutaneous, q8h VERA  magnesium sulfate, 2 g, intravenous, Once  multivitamin, 1 tablet, oral, Daily  potassium chloride CR, 20 mEq, oral, Daily  psyllium, 1 packet, oral, Daily  sacubitriL-valsartan, 1 tablet, oral, BID      Dietary Orders (From admission, onward)       Start     Ordered    04/16/24 1416  Adult diet 2-3 grams sodium  Diet effective now        Question:  Diet type  Answer:  2-3 grams sodium    04/16/24 1415                  Estimated Needs:   Estimated Energy Needs  Total Energy Estimated Needs (kCal): 2060 kCal  Total Estimated Energy Need per Day (kCal/kg): 25 kCal/kg  Method for Estimating Needs: ABW    Estimated Protein Needs  Total Protein Estimated Needs (g): 99 g  Total Protein Estimated Needs (g/kg): 1.2 g/kg  Method for Estimating Needs: ABW    Estimated Fluid Needs  Method for Estimating Needs: < 2000 ml/day        Nutrition Diagnosis   Nutrition Diagnosis:  Malnutrition Diagnosis  Patient has Malnutrition Diagnosis: No    Nutrition Diagnosis  Patient has Nutrition Diagnosis: Yes  Diagnosis Status (1): New  Nutrition Diagnosis 1: Food and nutrition related knowledge deficit  Related to (1): lack of prior exposure to accurate nutrition related information  As Evidenced by (1): request for education       Nutrition Interventions/Recommendations   Nutrition Interventions and Recommendations:    Nutrition Prescription:  Individualized Nutrition Prescription Provided for : 2060 kcals and 99g protein to be provided via diet    Nutrition Interventions:   Food and/or Nutrient Delivery Interventions  Interventions: Meals and snacks  Meals and Snacks: [place]-modified diet  Goal: order low Na+  diet at this time  Additional Interventions: offered low Na+ diet info - pt declined. but he was open to discussing meal planning to decreased his Na+ intake.    Education Documentation  Diet - Low Sodium, taught by Teodora Abrams RD, LD at 4/16/2024  1:25 PM.  Learner: Patient  Readiness: Acceptance  Method: Explanation  Response: Verbalizes Understanding             Nutrition Monitoring and Evaluation   Monitoring/Evaluation:   Food/Nutrient Related History Monitoring  Monitoring and Evaluation Plan: Energy intake  Energy Intake: Estimated energy intake  Criteria: pt to consume >/= 75% estimated needs  Additional Plans: pt will plan meals within prescribed guidelines       Time Spent/Follow-up:   Follow Up  Time Spent (min): 35 minutes  Last Date of Nutrition Visit: 04/16/24  Nutrition Follow-Up Needed?: 7-10 days  Follow up Comment: 4/23/24

## 2024-04-16 NOTE — CONSULTS
Inpatient consult to Cardiology  Consult performed by: Rafi Rm MD  Consult ordered by: Thomas Parker MD  Reason for consult: chf        History Of Present Illness:    Eh Saenz is a 78 y.o. male presenting with medical history significant for history of nonischemic cardiomyopathy ejection fraction 30 to 35% based on echocardiogram performed in February 2024 at our office following which patient was initiated on Entresto 24/26 mg half tablet twice a day given his borderline blood pressure and patient was initiated on Lasix 40 mg and provide supplementation of 20 mg daily.  The patient admits to me that he has not been taking the Lasix when he has an appointment outside his home and also admits to me that he has not taken his potassium for the last 1 week.  He lives independently in [place].  His son Ruperto who lives in [place] and as patient called his son this morning stating that he was not feeling well with symptoms of shortness of breath his son came from [place] but I could not able to see him as he left the office just before my evaluation.  Patient had history of left heart catheterization at our hospital in 2020 which was performed by my colleague Dr. Pedraza in my absence at which time he was noted to have ostial left main stenosis and patient did underwent ultrasound evaluation of the ostial left main stenosis and that was not significant enough to consider revascularization.  He was noted to have a normal left to to descending artery and left circumflex coronary artery and right coronary arteries.  Based on the recent left heart catheterization and the echocardiogram performed in the outpatient it was attributed patient has nonischemic cardiomyopathy and patient was initially on guideline directed medical therapy which he admits noncompliance.  4 days ago he was quite short of breath and went to urgent care and mentor thinking that he was having a COVID infection and  was discharged from the urgent care which is again possibly secondary to fluid overload from noncompliance to his diuretics.  He now came in with significant amount of fluid overload with both shortness of breath and 3+ pitting edema in both lower extremities  Last Recorded Vitals:  Vitals:    04/15/24 1200 04/15/24 1300 04/15/24 1345 04/15/24 1452   BP: 126/64 130/78 134/79 118/78   BP Location:    Left arm   Patient Position:    Sitting   Pulse: 92 89 95 97   Resp: 20 (!) 21 15 18   Temp:    37 °C (98.6 °F)   TempSrc:    Oral   SpO2: 95% 95% 98% 97%   Weight:           Last Labs:  CBC - 4/15/2024: 10:28 AM  7.6 13.2 194    42.2      CMP - 4/15/2024: 10:28 AM  9.8 6.6 19 --- 2.1   _ 3.9 18 67      PTT - No results in last year.  _   _ _     Hemoglobin A1C   Date/Time Value Ref Range Status   08/12/2020 10:57 AM 6.0 4.0 - 6.0 % Final     Comment:     Hemoglobin A1C levels are related to mean blood glucose during the   preceding 2-3 months. The relationship table below may be used as a   general guide. Each 1% increase in HGB A1C is a reflection of an   increase in mean glucose of approximately 30 mg/dl.   Reference: Diabetes Care, volume 29, supplement 1 Jan. 2006                        HGB A1C ................. Approx. Mean Glucose   _______________________________________________   6%   ...............................  120 mg/dl   7%   ...............................  150 mg/dl   8%   ...............................  180 mg/dl   9%   ...............................  210 mg/dl   10%  ...............................  240 mg/dl  Performed at [address] [address]       LDL Calculated   Date/Time Value Ref Range Status   08/28/2022 06:21  65 - 130 MG/DL Final   05/10/2019 01:57 AM 69 65 - 130 MG/DL Final      Last I/O:  No intake/output data recorded.    Past Cardiology Tests (Last 3 Years):  EKG:  No results found for this or any previous visit from the past 1095 days.    Echo:  No  "results found for this or any previous visit from the past 1095 days.    Ejection Fractions:  No results found for: \"EF\"  Cath:  No results found for this or any previous visit from the past 1095 days.    Stress Test:  No results found for this or any previous visit from the past 1095 days.    Cardiac Imaging:  No results found for this or any previous visit from the past 1095 days.      Past Medical History:  He has a past medical history of Kidney stones, Personal history of malignant neoplasm, unspecified, Personal history of other diseases of the circulatory system, Personal history of other diseases of urinary system, and Prostate cancer (Multi).    Past Surgical History:  He has a past surgical history that includes Other surgical history (10/18/2021); Other surgical history (10/18/2021); and Cataract extraction.      Social History:  He reports that he has never smoked. He has never used smokeless tobacco. No history on file for alcohol use and drug use.    Family History:  No family history on file.     Allergies:  Bactrim [sulfamethoxazole-trimethoprim], Ciprofloxacin, Dicyclomine, Meloxicam, and Rosuvastatin    Inpatient Medications:  Scheduled medications   Medication Dose Route Frequency    aspirin  81 mg oral Daily    atorvastatin  40 mg oral Nightly    cholecalciferol  5,000 Units oral Daily    furosemide  40 mg intravenous q8h    [START ON 4/16/2024] furosemide  40 mg oral Daily    heparin (porcine)  5,000 Units subcutaneous q8h VERA    magnesium sulfate  2 g intravenous Once    multivitamin  1 tablet oral Daily    potassium chloride CR  20 mEq oral Daily    [START ON 4/16/2024] psyllium  1 packet oral Daily    sacubitriL-valsartan  1 tablet oral BID     PRN medications   Medication    acetaminophen    Or    acetaminophen    Or    acetaminophen    benzocaine-menthol    dextromethorphan-guaifenesin    guaiFENesin    oxygen    polyethylene glycol     Continuous Medications   Medication Dose Last Rate "     Outpatient Medications:  Current Outpatient Medications   Medication Instructions    aspirin 81 mg, oral, Daily    atorvastatin calcium (ATORVASTATIN ORAL) 1 tablet, oral, Nightly    cholecalciferol (VITAMIN D-3) 25 mcg, oral, Daily    furosemide (LASIX) 40 mg, oral, Daily    multivitamin tablet 1 tablet, oral, Daily    potassium chloride CR 20 mEq ER tablet 20 mEq, oral, Daily, Do not crush or chew.    sacubitril/valsartan (ENTRESTO ORAL) 0.5 tablets, oral, 2 times daily       Physical Exam:  HEENT PERRLA neck is supple lungs clear to auscultation with good air entry heart S1-S2 present with no murmurs abdomen soft and nontender extremity with evidence of 2 plus edema in both lower extremities though improved in the last 12 hours as patient was on IV Lasix every 8 hours.     Assessment/Plan   1.  Acute on chronic LV systolic dysfunction.  Patient remains on IV Lasix 40 mg every 8 hours.  Patient was on Entresto at home prior to the hospitalization 24/26 mg half tablet twice daily.  In addition to IV Lasix patient also put on supplementation.  Patient acknowledges to me that he has not been taking the Lasix as recommended and he has not been taking the oral potassium as recommended.  Will repeat echocardiogram tomorrow to assess LV systolic function but for now I will continue the patient IV Lasix every 8 hours and follow his BMP closely.       Code Status:  Full Code    I spent 25 minutes in the professional and overall care of this patient.        Rafi Rm MD

## 2024-04-16 NOTE — PROGRESS NOTES
"   04/16/24 1559   Discharge Planning   Living Arrangements Alone   Support Systems Children;Friends/neighbors   Assistance Needed : Independent with homemaking with ambulation, Independent with ADLs and functional transfers   Type of Residence Private residence  (Two level (with basement but \"very seldom\" goes to basement. Often goes to second floor.)  Home Access:reports he grabs onto the door frame. Entrance Stairs-Number of Steps: 14 steps to second floor (has a handrail for stairs to second floor))   Number of Stairs to Enter Residence 2   Number of Stairs Within Residence 14   Do you have animals or pets at home? No   Home or Post Acute Services None   Patient expects to be discharged to: home with no needs   Does the patient need discharge transport arranged? No   Financial Resource Strain   How hard is it for you to pay for the very basics like food, housing, medical care, and heating? Not hard   Housing Stability   In the last 12 months, was there a time when you were not able to pay the mortgage or rent on time? N   In the last 12 months, was there a time when you did not have a steady place to sleep or slept in a shelter (including now)? N   Transportation Needs   In the past 12 months, has lack of transportation kept you from medical appointments or from getting medications? no   In the past 12 months, has lack of transportation kept you from meetings, work, or from getting things needed for daily living? No     77 y/o male presents secondary to CHF, chest pain, and headache. Pt demonstrates good safety awareness, independence with functional mobility. Pt appears at/near functional baseline with no further needs for skilled PT while in house. Pt denies physical concerns about home environment and ability to enter home.  Lives alone in a two story home, Independent with homemaking with ambulation, Independent with ADLs and functional transfers. Able to drive. Retired.   PLAN: Discharge to home with no " needs.

## 2024-04-16 NOTE — PROGRESS NOTES
Eh Saenz is a 78 y.o. male on day 0 of admission presenting with Acute on chronic heart failure, unspecified heart failure type (Multi).    Subjective   The patient was seen and examined.  Lying in the bed.  Comfortable.  Patient is still complaining of shortness of breath and says he is not back to his baseline.       Objective     Physical Exam  HEENT:  Head externally atraumatic,  extraocular movements intact, oral mucosa moist  Neck:  Supple, no JVP, no palpable adenopathy or thyromegaly.  No carotid bruit.  Chest:  Clear to auscultation and resonant.  Heart:  Regular rate and rhythm, no murmur or gallop could be appreciated.  Abdomen:  Soft, nontender, bowel sounds present, normoactive, no palpable hepatosplenomegaly.  Extremities:  No edema, pulses present, no cyanosis or clubbing.  CNS:  Patient alert, oriented to time, place and person.    No new deficit.  Cranial nerves 2-12 grossly intact  Skin:  No active rash.  Musculoskeletal:  No  apparent joint swelling or erythema, range of movement normal.  Last Recorded Vitals  Heart Rate:  [50-97]   Temp:  [36.3 °C (97.3 °F)-37 °C (98.6 °F)]   Resp:  [16-18]   BP: ()/(51-80)   Weight:  [103 kg (227 lb)]   SpO2:  [94 %-96 %]     Intake/Output last 3 Shifts:  I/O last 3 completed shifts:  In: 200 (1.9 mL/kg) [P.O.:200]  Out: 2100 (19.4 mL/kg) [Urine:2100 (0.5 mL/kg/hr)]  Weight: 108.1 kg     Relevant Results  No results found for the last 90 days.    Results for orders placed or performed during the hospital encounter of 04/15/24 (from the past 24 hour(s))   Magnesium   Result Value Ref Range    Magnesium 2.20 1.60 - 3.10 mg/dL   Urinalysis with Reflex Microscopic   Result Value Ref Range    Color, Urine Colorless (N) Light-Yellow, Yellow, Dark-Yellow    Appearance, Urine Clear Clear    Specific Gravity, Urine 1.006 1.005 - 1.035    pH, Urine 6.5 5.0, 5.5, 6.0, 6.5, 7.0, 7.5, 8.0    Protein, Urine NEGATIVE NEGATIVE, 10 (TRACE), 20 (TRACE) mg/dL     Glucose, Urine Normal Normal mg/dL    Blood, Urine NEGATIVE NEGATIVE    Ketones, Urine NEGATIVE NEGATIVE mg/dL    Bilirubin, Urine NEGATIVE NEGATIVE    Urobilinogen, Urine Normal Normal mg/dL    Nitrite, Urine NEGATIVE NEGATIVE    Leukocyte Esterase, Urine NEGATIVE NEGATIVE   CBC   Result Value Ref Range    WBC 7.6 4.4 - 11.3 x10*3/uL    nRBC 0.0 0.0 - 0.0 /100 WBCs    RBC 4.30 (L) 4.50 - 5.90 x10*6/uL    Hemoglobin 12.7 (L) 13.5 - 17.5 g/dL    Hematocrit 39.4 (L) 41.0 - 52.0 %    MCV 92 80 - 100 fL    MCH 29.5 26.0 - 34.0 pg    MCHC 32.2 32.0 - 36.0 g/dL    RDW 14.7 (H) 11.5 - 14.5 %    Platelets 172 150 - 450 x10*3/uL   TSH   Result Value Ref Range    Thyroid Stimulating Hormone 2.05 0.27 - 4.20 mIU/L   Comprehensive metabolic panel   Result Value Ref Range    Glucose 97 65 - 99 mg/dL    Sodium 139 133 - 145 mmol/L    Potassium 4.1 3.4 - 5.1 mmol/L    Chloride 102 97 - 107 mmol/L    Bicarbonate 26 24 - 31 mmol/L    Urea Nitrogen 20 8 - 25 mg/dL    Creatinine 1.20 0.40 - 1.60 mg/dL    eGFR 61 >60 mL/min/1.73m*2    Calcium 9.6 8.5 - 10.4 mg/dL    Albumin 3.6 3.5 - 5.0 g/dL    Alkaline Phosphatase 62 35 - 125 U/L    Total Protein 6.1 5.9 - 7.9 g/dL    AST 16 5 - 40 U/L    Bilirubin, Total 1.9 (H) 0.1 - 1.2 mg/dL    ALT 15 5 - 40 U/L    Anion Gap 11 <=19 mmol/L        Current Facility-Administered Medications:     acetaminophen (Tylenol) tablet 650 mg, 650 mg, oral, q6h PRN **OR** acetaminophen (Tylenol) oral liquid 650 mg, 650 mg, nasogastric tube, q6h PRN **OR** acetaminophen (Tylenol) suppository 650 mg, 650 mg, rectal, q6h PRN, Thomas Parker MD    aspirin EC tablet 81 mg, 81 mg, oral, Daily, Thomas Parker MD, 81 mg at 04/16/24 0927    atorvastatin (Lipitor) tablet 40 mg, 40 mg, oral, Nightly, Thomas Parker MD, 40 mg at 04/15/24 2135    benzocaine-menthol (Cepastat Sore Throat) 15-3.6 mg lozenge 1 lozenge, 1 lozenge, Mouth/Throat, q2h PRN, Thomas Parker MD    cholecalciferol (Vitamin D-3)  capsule 125 mcg, 5,000 Units, oral, Daily, Thomas Parker MD, 125 mcg at 04/16/24 0927    dextromethorphan-guaifenesin (Robitussin DM)  mg/5 mL oral liquid 5 mL, 5 mL, oral, q4h PRN, Thomas Parker MD    furosemide (Lasix) injection 40 mg, 40 mg, intravenous, q8h, Thomas Parker MD, 40 mg at 04/16/24 1358    furosemide (Lasix) tablet 40 mg, 40 mg, oral, Daily, Thomas Parker MD    guaiFENesin (Mucinex) 12 hr tablet 600 mg, 600 mg, oral, q12h PRN, Thomas Parker MD    heparin (porcine) injection 5,000 Units, 5,000 Units, subcutaneous, q8h VERA, Thomas Parker MD, 5,000 Units at 04/16/24 1358    magnesium sulfate IV 2 g, 2 g, intravenous, Once, Thomas Parker MD    multivitamin 1 tablet, 1 tablet, oral, Daily, Thomas Parker MD, 1 tablet at 04/16/24 0927    oxygen (O2) therapy, , inhalation, Continuous PRN - O2/gases, Thomas Parker MD    polyethylene glycol (Glycolax, Miralax) packet 17 g, 17 g, oral, Daily PRN, Thomas Parker MD    potassium chloride CR (Klor-Con M20) ER tablet 20 mEq, 20 mEq, oral, Daily, Thomas Parker MD, 20 mEq at 04/16/24 0928    psyllium (Metamucil) 3.4 gram packet 1 packet, 1 packet, oral, Daily, Thomas Parker MD, 1 packet at 04/16/24 1555    sacubitriL-valsartan (Entresto) 24-26 mg per tablet 1 tablet, 1 tablet, oral, BID, Thomas Parker MD, 1 tablet at 04/15/24 6009   Assessment/Plan   Principal Problem:    Acute on chronic heart failure, unspecified heart failure type (Multi)  Active Problems:    Elevated troponin    Chest pain  COPD  History of prostate cancer    Continue current medication.  Patient got negative balance of 2100 mL.  Monitor blood output.  Monitor daily weight.  Will take DVT, fall, aspiration decubitus pressure.  Monitor electrolytes and replete as needed.  This has been discussed with the patient and is agreeable to it.      Thomas Parker MD

## 2024-04-16 NOTE — CARE PLAN
Problem: Safety  Goal: Patient will be injury free during hospitalization  Outcome: Progressing  Goal: I will remain free of falls  Outcome: Progressing   The patient's goals for the shift include get some rest    The clinical goals for the shift include monitor input and output

## 2024-04-16 NOTE — NURSING NOTE
Lasix was held at this time with Bp of  99/99. Cardioogist was messaged     Pt also states he only takes half pil of Entrestro BID, the orders is for a 1 full tablet BID.

## 2024-04-16 NOTE — PROGRESS NOTES
"Occupational Therapy    Evaluation    Patient Name: Eh Saenz  MRN: [id number]  Today's Date: 4/16/2024  Time Calculation  Start Time: 0738  Stop Time: 0754  Time Calculation (min): 16 min        General:  General  Missed Visit: Yes  Missed Visit Reason: Patient refused  General Comment: Pt declines need for OT services following explanation of purpose/benefits:  \"I'm moving just fine.\"  Up ad cory per nursing as well.  Will sign off.    "

## 2024-04-16 NOTE — NURSING NOTE
Assumed care of patient. Patient in bed resting. No signs of distress or pain. Call light and belongings within reach, bed alarm on.

## 2024-04-16 NOTE — PROGRESS NOTES
04/16/24 1603   Physical Activity   On average, how many days per week do you engage in moderate to strenuous exercise (like a brisk walk)? 0 days   On average, how many minutes do you engage in exercise at this level? 0 min   Financial Resource Strain   How hard is it for you to pay for the very basics like food, housing, medical care, and heating? Not hard   Housing Stability   In the last 12 months, was there a time when you were not able to pay the mortgage or rent on time? N   In the last 12 months, was there a time when you did not have a steady place to sleep or slept in a shelter (including now)? N   Transportation Needs   In the past 12 months, has lack of transportation kept you from medical appointments or from getting medications? no   In the past 12 months, has lack of transportation kept you from meetings, work, or from getting things needed for daily living? No   Food Insecurity   Within the past 12 months, you worried that your food would run out before you got the money to buy more. Never true   Within the past 12 months, the food you bought just didn't last and you didn't have money to get more. Never true   Stress   Do you feel stress - tense, restless, nervous, or anxious, or unable to sleep at night because your mind is troubled all the time - these days? Not at all   Social Connections   In a typical week, how many times do you talk on the phone with family, friends, or neighbors? Once a week   How often do you get together with friends or relatives? Once   How often do you attend Holiness or Jewish services? Never   Do you belong to any clubs or organizations such as Holiness groups, unions, fraternal or athletic groups, or school groups? No   How often do you attend meetings of the clubs or organizations you belong to? Never   Are you , , , , never , or living with a partner?    Intimate Partner Violence   Within the last year, have you been  afraid of your partner or ex-partner? No   Within the last year, have you been humiliated or emotionally abused in other ways by your partner or ex-partner? No   Within the last year, have you been kicked, hit, slapped, or otherwise physically hurt by your partner or ex-partner? No   Within the last year, have you been raped or forced to have any kind of sexual activity by your partner or ex-partner? No   Alcohol Use   Q1: How often do you have a drink containing alcohol? Never   Q2: How many drinks containing alcohol do you have on a typical day when you are drinking? None   Q3: How often do you have six or more drinks on one occasion? Never   Utilities   In the past 12 months has the electric, gas, oil, or water company threatened to shut off services in your home? No   Health Literacy   How often do you need to have someone help you when you read instructions, pamphlets, or other written material from your doctor or pharmacy? Never

## 2024-04-16 NOTE — PROGRESS NOTES
04/16/24 1553   [facility] Disability Status   Are you deaf or do you have serious difficulty hearing? N   Are you blind or do you have serious difficulty seeing, even when wearing glasses? N   Because of a physical, mental, or emotional condition, do you have serious difficulty concentrating, remembering, or making decisions? (5 years old or older) N   Do you have serious difficulty walking or climbing stairs? N   Do you have serious difficulty dressing or bathing? N   Because of a physical, mental, or emotional condition, do you have serious difficulty doing errands alone such as visiting the doctor? N

## 2024-04-17 ENCOUNTER — PATIENT OUTREACH (OUTPATIENT)
Dept: CASE MANAGEMENT | Facility: HOSPITAL | Age: 79
End: 2024-04-17
Payer: MEDICARE

## 2024-04-17 LAB
ANION GAP SERPL CALC-SCNC: 10 MMOL/L
AORTIC VALVE PEAK VELOCITY: 1.27 M/S
AV PEAK GRADIENT: 6.5 MMHG
AVA (PEAK VEL): 1.68 CM2
BASOPHILS # BLD AUTO: 0.04 X10*3/UL (ref 0–0.1)
BASOPHILS NFR BLD AUTO: 0.6 %
BUN SERPL-MCNC: 26 MG/DL (ref 8–25)
CALCIUM SERPL-MCNC: 9.4 MG/DL (ref 8.5–10.4)
CHLORIDE SERPL-SCNC: 101 MMOL/L (ref 97–107)
CO2 SERPL-SCNC: 24 MMOL/L (ref 24–31)
CREAT SERPL-MCNC: 1.3 MG/DL (ref 0.4–1.6)
EGFRCR SERPLBLD CKD-EPI 2021: 56 ML/MIN/1.73M*2
EJECTION FRACTION APICAL 4 CHAMBER: 27.7
EOSINOPHIL # BLD AUTO: 0.19 X10*3/UL (ref 0–0.4)
EOSINOPHIL NFR BLD AUTO: 2.6 %
ERYTHROCYTE [DISTWIDTH] IN BLOOD BY AUTOMATED COUNT: 14.6 % (ref 11.5–14.5)
GLUCOSE SERPL-MCNC: 97 MG/DL (ref 65–99)
HCT VFR BLD AUTO: 40.3 % (ref 41–52)
HGB BLD-MCNC: 13 G/DL (ref 13.5–17.5)
IMM GRANULOCYTES # BLD AUTO: 0.02 X10*3/UL (ref 0–0.5)
IMM GRANULOCYTES NFR BLD AUTO: 0.3 % (ref 0–0.9)
LEFT ATRIUM VOLUME AREA LENGTH INDEX BSA: 42.3 ML/M2
LEFT VENTRICLE INTERNAL DIMENSION DIASTOLE: 6.31 CM (ref 3.5–6)
LEFT VENTRICULAR OUTFLOW TRACT DIAMETER: 2 CM
LYMPHOCYTES # BLD AUTO: 1.01 X10*3/UL (ref 0.8–3)
LYMPHOCYTES NFR BLD AUTO: 14 %
MCH RBC QN AUTO: 29.7 PG (ref 26–34)
MCHC RBC AUTO-ENTMCNC: 32.3 G/DL (ref 32–36)
MCV RBC AUTO: 92 FL (ref 80–100)
MITRAL VALVE E/A RATIO: 1.85
MITRAL VALVE E/E' RATIO: 13.5
MONOCYTES # BLD AUTO: 1.07 X10*3/UL (ref 0.05–0.8)
MONOCYTES NFR BLD AUTO: 14.9 %
NEUTROPHILS # BLD AUTO: 4.87 X10*3/UL (ref 1.6–5.5)
NEUTROPHILS NFR BLD AUTO: 67.6 %
NRBC BLD-RTO: 0 /100 WBCS (ref 0–0)
PLATELET # BLD AUTO: 172 X10*3/UL (ref 150–450)
POTASSIUM SERPL-SCNC: 4.1 MMOL/L (ref 3.4–5.1)
RBC # BLD AUTO: 4.37 X10*6/UL (ref 4.5–5.9)
RIGHT VENTRICLE FREE WALL PEAK S': 7.72 CM/S
RIGHT VENTRICLE PEAK SYSTOLIC PRESSURE: 34.8 MMHG
SODIUM SERPL-SCNC: 135 MMOL/L (ref 133–145)
TRICUSPID ANNULAR PLANE SYSTOLIC EXCURSION: 1.3 CM
WBC # BLD AUTO: 7.2 X10*3/UL (ref 4.4–11.3)

## 2024-04-17 PROCEDURE — 2500000006 HC RX 250 W HCPCS SELF ADMINISTERED DRUGS (ALT 637 FOR ALL PAYERS): Mod: MUE | Performed by: INTERNAL MEDICINE

## 2024-04-17 PROCEDURE — 2500000001 HC RX 250 WO HCPCS SELF ADMINISTERED DRUGS (ALT 637 FOR MEDICARE OP): Performed by: INTERNAL MEDICINE

## 2024-04-17 PROCEDURE — 2500000004 HC RX 250 GENERAL PHARMACY W/ HCPCS (ALT 636 FOR OP/ED): Performed by: INTERNAL MEDICINE

## 2024-04-17 PROCEDURE — 96372 THER/PROPH/DIAG INJ SC/IM: CPT | Performed by: INTERNAL MEDICINE

## 2024-04-17 PROCEDURE — G0378 HOSPITAL OBSERVATION PER HR: HCPCS

## 2024-04-17 PROCEDURE — 85025 COMPLETE CBC W/AUTO DIFF WBC: CPT | Performed by: INTERNAL MEDICINE

## 2024-04-17 PROCEDURE — 36415 COLL VENOUS BLD VENIPUNCTURE: CPT | Performed by: INTERNAL MEDICINE

## 2024-04-17 PROCEDURE — 80048 BASIC METABOLIC PNL TOTAL CA: CPT | Performed by: INTERNAL MEDICINE

## 2024-04-17 RX ORDER — GUAIFENESIN 600 MG/1
600 TABLET, EXTENDED RELEASE ORAL 2 TIMES DAILY
Status: DISCONTINUED | OUTPATIENT
Start: 2024-04-17 | End: 2024-04-20 | Stop reason: HOSPADM

## 2024-04-17 RX ORDER — AMOXICILLIN AND CLAVULANATE POTASSIUM 875; 125 MG/1; MG/1
1 TABLET, FILM COATED ORAL EVERY 12 HOURS SCHEDULED
Status: DISCONTINUED | OUTPATIENT
Start: 2024-04-17 | End: 2024-04-20 | Stop reason: HOSPADM

## 2024-04-17 RX ADMIN — HEPARIN SODIUM 5000 UNITS: 5000 INJECTION, SOLUTION INTRAVENOUS; SUBCUTANEOUS at 20:59

## 2024-04-17 RX ADMIN — ASPIRIN 81 MG: 81 TABLET, COATED ORAL at 11:19

## 2024-04-17 RX ADMIN — GUAIFENESIN 600 MG: 600 TABLET ORAL at 20:59

## 2024-04-17 RX ADMIN — PSYLLIUM HUSK 1 PACKET: 3.4 POWDER ORAL at 16:50

## 2024-04-17 RX ADMIN — MULTIVITAMIN TABLET 1 TABLET: TABLET at 11:19

## 2024-04-17 RX ADMIN — Medication 125 MCG: at 11:19

## 2024-04-17 RX ADMIN — AMOXICILLIN AND CLAVULANATE POTASSIUM 1 TABLET: 875; 125 TABLET, FILM COATED ORAL at 12:41

## 2024-04-17 RX ADMIN — ACETAMINOPHEN 650 MG: 325 TABLET ORAL at 11:19

## 2024-04-17 RX ADMIN — GUAIFENESIN 600 MG: 600 TABLET ORAL at 12:41

## 2024-04-17 RX ADMIN — ATORVASTATIN CALCIUM 40 MG: 40 TABLET, FILM COATED ORAL at 20:59

## 2024-04-17 RX ADMIN — AMOXICILLIN AND CLAVULANATE POTASSIUM 1 TABLET: 875; 125 TABLET, FILM COATED ORAL at 20:58

## 2024-04-17 RX ADMIN — HEPARIN SODIUM 5000 UNITS: 5000 INJECTION, SOLUTION INTRAVENOUS; SUBCUTANEOUS at 15:47

## 2024-04-17 RX ADMIN — POTASSIUM CHLORIDE 20 MEQ: 1500 TABLET, EXTENDED RELEASE ORAL at 11:19

## 2024-04-17 RX ADMIN — FUROSEMIDE 40 MG: 40 TABLET ORAL at 11:19

## 2024-04-17 ASSESSMENT — COGNITIVE AND FUNCTIONAL STATUS - GENERAL
MOBILITY SCORE: 24
MOBILITY SCORE: 24
DAILY ACTIVITIY SCORE: 24
MOBILITY SCORE: 24

## 2024-04-17 ASSESSMENT — PAIN SCALES - GENERAL
PAINLEVEL_OUTOF10: 0 - NO PAIN
PAINLEVEL_OUTOF10: 4

## 2024-04-17 NOTE — PROGRESS NOTES
Music Therapy Note    Eh Saenz     Therapy Session  Referral Type: New referral this admission  Visit Type: New visit  Session Start Time: 0957  Conflict of Service: Working with other staff               Treatment/Interventions            Narrative  Follow-up: MT will follow-up if Pt remains admitted.    Education Documentation  No documentation found.

## 2024-04-17 NOTE — CARE PLAN
Problem: Safety  Goal: I will remain free of falls  Outcome: Progressing     Problem: Daily Care  Goal: Daily care needs are met  Outcome: Progressing     Problem: Psychosocial Needs  Goal: Demonstrates ability to cope with hospitalization/illness  Outcome: Progressing   The patient's goals for the shift include get some rest    The clinical goals for the shift include monitor labs

## 2024-04-17 NOTE — NURSING NOTE
CHF education/book given to patient. Discussed low sodium diet, importance of daily weights, following up with physician appointments, taking medications as prescribed. Cardiac rehab education/handout given to patient.

## 2024-04-17 NOTE — NURSING NOTE
Met with patient @ bedside for CHF education reinforcement. Discussed CHF, signs and symptoms, and when to call cardiologist. Reinforced the importance of following a Low Sodium Diet and monitoring daily weight, lower leg edema, and shortness of breath. Reviewed importance of taking prescribed medications after discharge. Reinforced importance of following up with cardiologist within 2 weeks of discharge.  I will continue to follow while IP & post hospital discharge to help manage CHF.

## 2024-04-17 NOTE — NURSING NOTE
Pt refusing bed alarm. Educated on importance for safety. Pt is up independently with a steady gate. Stated he will call before getting up.

## 2024-04-17 NOTE — CARE PLAN
The patient's goals for the shift include SAFETY  Problem: Safety  Goal: Patient will be injury free during hospitalization  Outcome: Progressing  Goal: I will remain free of falls  Outcome: Progressing       The clinical goals for the shift include monitor labs

## 2024-04-17 NOTE — PROGRESS NOTES
Eh Saenz is a 78 y.o. male on day 0 of admission presenting with Acute on chronic heart failure, unspecified heart failure type (Multi).    Subjective   The patient was seen and examined.  Lying in the bed.  Comfortable.  Did not look in acute distress.  Patient denies any headache or dizziness.  No nausea or vomiting       Objective     Physical Exam  HEENT:  Head externally atraumatic,  extraocular movements intact, oral mucosa moist. patient has sinus congestion  Neck:  Supple, no JVP, no palpable adenopathy or thyromegaly.  No carotid bruit.  Chest:  Clear to auscultation and resonant.  Heart:  Regular rate and rhythm, no murmur or gallop could be appreciated.  Abdomen:  Soft, nontender, bowel sounds present, normoactive, no palpable hepatosplenomegaly.  Extremities:  No edema, pulses present, no cyanosis or clubbing.  CNS:  Patient alert, oriented to time, place and person.    No new deficit.  Cranial nerves 2-12 grossly intact  Skin:  No active rash.  Musculoskeletal:  No  apparent joint swelling or erythema, range of movement normal.  Last Recorded Vitals  Heart Rate:  [50-95]   Temp:  [36.4 °C (97.5 °F)-37 °C (98.6 °F)]   Resp:  [14-18]   BP: (100-126)/(44-80)   Weight:  [103 kg (227 lb)]   SpO2:  [93 %-97 %]     Intake/Output last 3 Shifts:  I/O last 3 completed shifts:  In: 320 (3.1 mL/kg) [P.O.:320]  Out: 2900 (28.2 mL/kg) [Urine:2900 (0.8 mL/kg/hr)]  Weight: 103 kg     Relevant Results  No results found for the last 90 days.    Results for orders placed or performed during the hospital encounter of 04/15/24 (from the past 24 hour(s))   CBC and Auto Differential   Result Value Ref Range    WBC 7.2 4.4 - 11.3 x10*3/uL    nRBC 0.0 0.0 - 0.0 /100 WBCs    RBC 4.37 (L) 4.50 - 5.90 x10*6/uL    Hemoglobin 13.0 (L) 13.5 - 17.5 g/dL    Hematocrit 40.3 (L) 41.0 - 52.0 %    MCV 92 80 - 100 fL    MCH 29.7 26.0 - 34.0 pg    MCHC 32.3 32.0 - 36.0 g/dL    RDW 14.6 (H) 11.5 - 14.5 %    Platelets 172 150 - 450  x10*3/uL    Neutrophils % 67.6 40.0 - 80.0 %    Immature Granulocytes %, Automated 0.3 0.0 - 0.9 %    Lymphocytes % 14.0 13.0 - 44.0 %    Monocytes % 14.9 2.0 - 10.0 %    Eosinophils % 2.6 0.0 - 6.0 %    Basophils % 0.6 0.0 - 2.0 %    Neutrophils Absolute 4.87 1.60 - 5.50 x10*3/uL    Immature Granulocytes Absolute, Automated 0.02 0.00 - 0.50 x10*3/uL    Lymphocytes Absolute 1.01 0.80 - 3.00 x10*3/uL    Monocytes Absolute 1.07 (H) 0.05 - 0.80 x10*3/uL    Eosinophils Absolute 0.19 0.00 - 0.40 x10*3/uL    Basophils Absolute 0.04 0.00 - 0.10 x10*3/uL   Basic Metabolic Panel   Result Value Ref Range    Glucose 97 65 - 99 mg/dL    Sodium 135 133 - 145 mmol/L    Potassium 4.1 3.4 - 5.1 mmol/L    Chloride 101 97 - 107 mmol/L    Bicarbonate 24 24 - 31 mmol/L    Urea Nitrogen 26 (H) 8 - 25 mg/dL    Creatinine 1.30 0.40 - 1.60 mg/dL    eGFR 56 (L) >60 mL/min/1.73m*2    Calcium 9.4 8.5 - 10.4 mg/dL    Anion Gap 10 <=19 mmol/L        Current Facility-Administered Medications:     acetaminophen (Tylenol) tablet 650 mg, 650 mg, oral, q6h PRN, 650 mg at 04/17/24 1119 **OR** acetaminophen (Tylenol) oral liquid 650 mg, 650 mg, nasogastric tube, q6h PRN **OR** acetaminophen (Tylenol) suppository 650 mg, 650 mg, rectal, q6h PRN, Thomas Pakrer MD    aspirin EC tablet 81 mg, 81 mg, oral, Daily, Thomas Parker MD, 81 mg at 04/17/24 1119    atorvastatin (Lipitor) tablet 40 mg, 40 mg, oral, Nightly, Thomas Parker MD, 40 mg at 04/16/24 2041    benzocaine-menthol (Cepastat Sore Throat) 15-3.6 mg lozenge 1 lozenge, 1 lozenge, Mouth/Throat, q2h PRN, Thomas Parker MD    cholecalciferol (Vitamin D-3) capsule 125 mcg, 5,000 Units, oral, Daily, Thomas Parker MD, 125 mcg at 04/17/24 1119    dextromethorphan-guaifenesin (Robitussin DM)  mg/5 mL oral liquid 5 mL, 5 mL, oral, q4h PRN, Thomas Parker MD    furosemide (Lasix) tablet 40 mg, 40 mg, oral, Daily, Thomas Parker MD, 40 mg at 04/17/24 1119     guaiFENesin (Mucinex) 12 hr tablet 600 mg, 600 mg, oral, q12h PRN, Thomas Parker MD    heparin (porcine) injection 5,000 Units, 5,000 Units, subcutaneous, q8h VERA, Thomas Parker MD, 5,000 Units at 04/16/24 2200    multivitamin 1 tablet, 1 tablet, oral, Daily, Thomas Parker MD, 1 tablet at 04/17/24 1119    oxygen (O2) therapy, , inhalation, Continuous PRN - O2/gases, Thomas Parker MD    polyethylene glycol (Glycolax, Miralax) packet 17 g, 17 g, oral, Daily PRN, Thomas Parker MD    potassium chloride CR (Klor-Con M20) ER tablet 20 mEq, 20 mEq, oral, Daily, Thomas Parker MD, 20 mEq at 04/17/24 1119    psyllium (Metamucil) 3.4 gram packet 1 packet, 1 packet, oral, Daily, Thomas Parker MD, 1 packet at 04/16/24 1558    sacubitriL-valsartan (Entresto) 24-26 mg per tablet 1 tablet, 1 tablet, oral, BID, Thomas Parker MD, 1 tablet at 04/15/24 5949   Assessment/Plan   Principal Problem:    Acute on chronic heart failure, unspecified heart failure type (Multi)  Active Problems:    Elevated troponin    Chest pain  Sinusitis  Constipation  Congestive heart failure  Hyperkalemia    Plan: Continue current medication.  Supportive care.  Physical therapy and Occupational Therapy.  Patient does not want to go home.  He is listing to go home because he thinks that is worse than what he came in with.  Patient chest is clear.  Patient got some sinus congestion with greenish-yellow expectoration.  Will start the patient on oral antibiotics.  Also start the patient on Mucinex.  Patient can be discharged once cleared by cardiology.         Thomas Parker MD

## 2024-04-17 NOTE — PROGRESS NOTES
Consult placed for HF Nurse Navigator. Met with patient @ bedside yesterday for information exchange & CHF education. Discussed HF Nurse Navigator role/HF program. Patient agreeable to receiving HF education & being followed post hospital discharge. Patient reports that he lives home alone, independent & drives. He follows up regularly with his PCP, Dr JANUSZ Wood & Cardiologist, Dr Rm. [facility] @ [facility]. Reports that he is aware of his CHF dx., which he was dx with in past 6 months. He was in the process of buying new scale prior to coming to the ER. Does not use salt shaker, but admits to liking foods high in Na. He is doing his best to cut back on salt intake. Feels that he drinks < 2 liters of fluids daily. Most of the time he takes his medications. Recently stopped taking his Lasix & Potassium.  Poor understanding of medications & HF flare up symptoms. Provided CHF book/handouts & reviewed. Educated on CHF,EF,HF flare up symptoms, cardiac rehab & managing CHF.   HEART FAILURE EDUCATION:  1. Weigh yourself daily and record on your weight log.  2. If you gain more than 2 or 3 pounds overnight or > 5 lbs in week, call your cardiologist.  3. Follow a low sodium diet. No more than 2000 mg in one day, or more than 650 mg per meal.  4. Limit total fluids to no more than 6-8 cups (48-64 oz) per day - this includes all fluids (water, coffee, juice, milk, tea, etc.)  5. Call to schedule your follow-up appointments when you get home if they were not already scheduled for you.  6. Keep your follow-up appointments! Bring your weight log with you so the doctors can see your weight trend.  7. Be sure to  any new prescriptions and take them as directed. If unsure of the medications, be sure to call your cardiologist.  8. Stay as active as you can tolerate.   9. If you notice subtle change of symptoms (slight increase in swelling, slight shortness of breath, a new  intolerance to laying flat, a new cough), be sure to call your cardiologist.  Patient verbalized understanding of CHF education. He has appointment scheduled with Cardiology April 30th. Informed him to keep appt. I will continue to follow while IP & post hospital discharge to help manage CHF.

## 2024-04-18 LAB
ANION GAP SERPL CALC-SCNC: 10 MMOL/L
BASOPHILS # BLD AUTO: 0.04 X10*3/UL (ref 0–0.1)
BASOPHILS NFR BLD AUTO: 0.5 %
BUN SERPL-MCNC: 31 MG/DL (ref 8–25)
CALCIUM SERPL-MCNC: 9.5 MG/DL (ref 8.5–10.4)
CHLORIDE SERPL-SCNC: 101 MMOL/L (ref 97–107)
CO2 SERPL-SCNC: 25 MMOL/L (ref 24–31)
CREAT SERPL-MCNC: 1.4 MG/DL (ref 0.4–1.6)
EGFRCR SERPLBLD CKD-EPI 2021: 51 ML/MIN/1.73M*2
EOSINOPHIL # BLD AUTO: 0.3 X10*3/UL (ref 0–0.4)
EOSINOPHIL NFR BLD AUTO: 3.6 %
ERYTHROCYTE [DISTWIDTH] IN BLOOD BY AUTOMATED COUNT: 14.6 % (ref 11.5–14.5)
GLUCOSE BLD MANUAL STRIP-MCNC: 179 MG/DL (ref 74–99)
GLUCOSE BLD MANUAL STRIP-MCNC: 56 MG/DL (ref 74–99)
GLUCOSE SERPL-MCNC: 125 MG/DL (ref 65–99)
HCT VFR BLD AUTO: 41.6 % (ref 41–52)
HGB BLD-MCNC: 13.2 G/DL (ref 13.5–17.5)
IMM GRANULOCYTES # BLD AUTO: 0.03 X10*3/UL (ref 0–0.5)
IMM GRANULOCYTES NFR BLD AUTO: 0.4 % (ref 0–0.9)
LYMPHOCYTES # BLD AUTO: 1.1 X10*3/UL (ref 0.8–3)
LYMPHOCYTES NFR BLD AUTO: 13.2 %
MCH RBC QN AUTO: 29 PG (ref 26–34)
MCHC RBC AUTO-ENTMCNC: 31.7 G/DL (ref 32–36)
MCV RBC AUTO: 91 FL (ref 80–100)
MONOCYTES # BLD AUTO: 1.13 X10*3/UL (ref 0.05–0.8)
MONOCYTES NFR BLD AUTO: 13.6 %
NEUTROPHILS # BLD AUTO: 5.71 X10*3/UL (ref 1.6–5.5)
NEUTROPHILS NFR BLD AUTO: 68.7 %
NRBC BLD-RTO: 0 /100 WBCS (ref 0–0)
PLATELET # BLD AUTO: 180 X10*3/UL (ref 150–450)
POTASSIUM SERPL-SCNC: 4.7 MMOL/L (ref 3.4–5.1)
RBC # BLD AUTO: 4.55 X10*6/UL (ref 4.5–5.9)
SODIUM SERPL-SCNC: 136 MMOL/L (ref 133–145)
WBC # BLD AUTO: 8.3 X10*3/UL (ref 4.4–11.3)

## 2024-04-18 PROCEDURE — 36415 COLL VENOUS BLD VENIPUNCTURE: CPT | Performed by: INTERNAL MEDICINE

## 2024-04-18 PROCEDURE — 96372 THER/PROPH/DIAG INJ SC/IM: CPT | Performed by: INTERNAL MEDICINE

## 2024-04-18 PROCEDURE — G0378 HOSPITAL OBSERVATION PER HR: HCPCS

## 2024-04-18 PROCEDURE — 82947 ASSAY GLUCOSE BLOOD QUANT: CPT

## 2024-04-18 PROCEDURE — 80048 BASIC METABOLIC PNL TOTAL CA: CPT | Performed by: INTERNAL MEDICINE

## 2024-04-18 PROCEDURE — 85025 COMPLETE CBC W/AUTO DIFF WBC: CPT | Performed by: INTERNAL MEDICINE

## 2024-04-18 PROCEDURE — 2500000001 HC RX 250 WO HCPCS SELF ADMINISTERED DRUGS (ALT 637 FOR MEDICARE OP): Performed by: INTERNAL MEDICINE

## 2024-04-18 PROCEDURE — 2500000001 HC RX 250 WO HCPCS SELF ADMINISTERED DRUGS (ALT 637 FOR MEDICARE OP): Performed by: REGISTERED NURSE

## 2024-04-18 PROCEDURE — 2500000006 HC RX 250 W HCPCS SELF ADMINISTERED DRUGS (ALT 637 FOR ALL PAYERS): Performed by: INTERNAL MEDICINE

## 2024-04-18 PROCEDURE — 2500000004 HC RX 250 GENERAL PHARMACY W/ HCPCS (ALT 636 FOR OP/ED): Performed by: INTERNAL MEDICINE

## 2024-04-18 RX ORDER — ADHESIVE BANDAGE
30 BANDAGE TOPICAL DAILY PRN
Status: DISCONTINUED | OUTPATIENT
Start: 2024-04-18 | End: 2024-04-20 | Stop reason: HOSPADM

## 2024-04-18 RX ORDER — METOPROLOL SUCCINATE 25 MG/1
12.5 TABLET, EXTENDED RELEASE ORAL 2 TIMES DAILY
Status: DISCONTINUED | OUTPATIENT
Start: 2024-04-18 | End: 2024-04-20 | Stop reason: HOSPADM

## 2024-04-18 RX ADMIN — AMOXICILLIN AND CLAVULANATE POTASSIUM 1 TABLET: 875; 125 TABLET, FILM COATED ORAL at 21:02

## 2024-04-18 RX ADMIN — SACUBITRIL AND VALSARTAN 1 TABLET: 24; 26 TABLET, FILM COATED ORAL at 09:29

## 2024-04-18 RX ADMIN — METOPROLOL SUCCINATE 12.5 MG: 25 TABLET, FILM COATED, EXTENDED RELEASE ORAL at 16:10

## 2024-04-18 RX ADMIN — ATORVASTATIN CALCIUM 40 MG: 40 TABLET, FILM COATED ORAL at 21:03

## 2024-04-18 RX ADMIN — GUAIFENESIN 600 MG: 600 TABLET ORAL at 09:29

## 2024-04-18 RX ADMIN — Medication 125 MCG: at 09:29

## 2024-04-18 RX ADMIN — GUAIFENESIN 600 MG: 600 TABLET ORAL at 21:03

## 2024-04-18 RX ADMIN — FUROSEMIDE 40 MG: 40 TABLET ORAL at 09:29

## 2024-04-18 RX ADMIN — SACUBITRIL AND VALSARTAN 1 TABLET: 24; 26 TABLET, FILM COATED ORAL at 21:05

## 2024-04-18 RX ADMIN — AMOXICILLIN AND CLAVULANATE POTASSIUM 1 TABLET: 875; 125 TABLET, FILM COATED ORAL at 09:28

## 2024-04-18 RX ADMIN — HEPARIN SODIUM 5000 UNITS: 5000 INJECTION, SOLUTION INTRAVENOUS; SUBCUTANEOUS at 14:28

## 2024-04-18 RX ADMIN — MULTIVITAMIN TABLET 1 TABLET: TABLET at 09:29

## 2024-04-18 RX ADMIN — POTASSIUM CHLORIDE 20 MEQ: 1500 TABLET, EXTENDED RELEASE ORAL at 09:29

## 2024-04-18 RX ADMIN — ASPIRIN 81 MG: 81 TABLET, COATED ORAL at 09:28

## 2024-04-18 RX ADMIN — HEPARIN SODIUM 5000 UNITS: 5000 INJECTION, SOLUTION INTRAVENOUS; SUBCUTANEOUS at 21:04

## 2024-04-18 RX ADMIN — HEPARIN SODIUM 5000 UNITS: 5000 INJECTION, SOLUTION INTRAVENOUS; SUBCUTANEOUS at 05:48

## 2024-04-18 RX ADMIN — MAGNESIUM HYDROXIDE 30 ML: 1200 LIQUID ORAL at 11:15

## 2024-04-18 ASSESSMENT — ENCOUNTER SYMPTOMS
ORTHOPNEA: 1
DYSPNEA ON EXERTION: 1

## 2024-04-18 ASSESSMENT — PAIN SCALES - GENERAL
PAINLEVEL_OUTOF10: 0 - NO PAIN

## 2024-04-18 ASSESSMENT — PAIN - FUNCTIONAL ASSESSMENT
PAIN_FUNCTIONAL_ASSESSMENT: 0-10

## 2024-04-18 ASSESSMENT — COGNITIVE AND FUNCTIONAL STATUS - GENERAL
DAILY ACTIVITIY SCORE: 24
MOBILITY SCORE: 24

## 2024-04-18 NOTE — NURSING NOTE
PATRICA Salinas  called pt is being started on beta blocker she wants the first dose now and next dose later than 2100

## 2024-04-18 NOTE — CARE PLAN
The patient's goals for the shift include get some rest    The clinical goals for the shift include safety    Problem: Pain  Goal: My pain/discomfort is manageable  Outcome: Progressing     Problem: Safety  Goal: Patient will be injury free during hospitalization  Outcome: Progressing  Goal: I will remain free of falls  Outcome: Progressing     Problem: Daily Care  Goal: Daily care needs are met  Outcome: Progressing     Problem: Psychosocial Needs  Goal: Demonstrates ability to cope with hospitalization/illness  Outcome: Progressing  Goal: Collaborate with me, my family, and caregiver to identify my specific goals  Outcome: Progressing     Problem: Fall/Injury  Goal: Not fall by end of shift  Outcome: Progressing  Goal: Be free from injury by end of the shift  Outcome: Progressing  Goal: Verbalize understanding of personal risk factors for fall in the hospital  Outcome: Progressing  Goal: Verbalize understanding of risk factor reduction measures to prevent injury from fall in the home  Outcome: Progressing  Goal: Use assistive devices by end of the shift  Outcome: Progressing  Goal: Pace activities to prevent fatigue by end of the shift  Outcome: Progressing     Problem: Respiratory  Goal: Clear secretions with interventions this shift  Outcome: Progressing  Goal: Minimize anxiety/maximize coping throughout shift  Outcome: Progressing  Goal: Minimal/no exertional discomfort or dyspnea this shift  Outcome: Progressing  Goal: No signs of respiratory distress (eg. Use of accessory muscles. Peds grunting)  Outcome: Progressing  Goal: Patent airway maintained this shift  Outcome: Progressing  Goal: Tolerate mechanical ventilation evidenced by VS/agitation level this shift  Outcome: Progressing  Goal: Tolerate pulmonary toileting this shift  Outcome: Progressing  Goal: Verbalize decreased shortness of breath this shift  Outcome: Progressing  Goal: Wean oxygen to maintain O2 saturation per order/standard this  shift  Outcome: Progressing  Goal: Increase self care and/or family involvement in next 24 hours  Outcome: Progressing     Problem: Chronic Conditions and Co-morbidities  Goal: Patient's chronic conditions and co-morbidity symptoms are monitored and maintained or improved  Outcome: Progressing     Problem: Heart Failure  Goal: Improved gas exchange this shift  Outcome: Progressing  Goal: Improved urinary output this shift  Outcome: Progressing  Goal: Reduction in peripheral edema within 24 hours  Outcome: Progressing

## 2024-04-18 NOTE — PROGRESS NOTES
"Music Therapy Note    Eh Saenz     Therapy Session  Referral Type: New referral this admission  Visit Type: New visit  Session Start Time: 1335  Session End Time: 1339  Intervention Delivery: In-person  Conflict of Service: None     Pre-assessment  Pain Score:  (\"Not great\")  Stress Level (0-10):  (\"I'm stressed about family stuff from me being in here.\")  Mood/Affect: Appropriate, Anxious         Treatment/Interventions  Areas of Focus: Anxiety reduction, Emotional support, Pain management, Relaxation  Music Therapy Interventions: Assessment, Empathic listening/validating emotions    Post-assessment  Total Session Time (min): 4 minutes    Narrative  Assessment Detail: Pt found sitting in chair. MT introduced self and services, educating Pt on benefits of music therapy.  Intervention: Pt declined session at this time, but talked about his current stress.  Follow-up: MT will follow-up if Pt remains admitted.    Education Documentation  No documentation found.          "

## 2024-04-18 NOTE — PROGRESS NOTES
04/18/24 1511   Discharge Planning   Living Arrangements Alone   Support Systems Family members;Friends/neighbors   Assistance Needed independent   Type of Residence Private residence   Patient expects to be discharged to: Home with no needs   Does the patient need discharge transport arranged? No     Per cardiology note: He was on Lasix and put on supplementation at home but he has not been noncompliant with these medications.  Will request ultrafiltered services to evaluate for possible Vest/AICD implantation.  Reinforced importance of taking Entresto and diuretics with potassium supplementation postdischarge.  He receives medications from the [facility].   Per MD note: patient is not ready for discharge. Patient can be discharged once cleared by cardiology.     PLAN: Discharge to home with no needs

## 2024-04-18 NOTE — CONSULTS
Inpatient consult to Cardiology  Consult performed by: ANANYA Solares-CNP  Consult ordered by: Rafi Rm MD  Reason for consult: Cardiomyopathy ejection fraction 30-35%        History Of Present Illness:    Eh Saenz is a 78 y.o. male presenting with acute systolic heart failure.  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  Most recent cardiac catheterization in 2020 mild obstructive disease with a left main lesion which was ultrasounded engaged approximately 40%, recent nuclear stress test in February of this year without any reversible ischemia, echocardiogram performed now with an ejection fraction of 30-35%.  At that time he was placed on guideline directed medical therapy along with furosemide which she was not utilizing regularly and presents with worsening dyspnea lower extremity edema evidence for decompensated heart failure.  Additional history prostate cancer with seed implantation, kidney stones, diverticulitis, osteoarthritis in the emergency room BUN/creatinine 20 and 1.20, potassium 4.1, normal magnesium and thyroid, normal CBC, proBNP 2300.  Patient was diuresed.  Initial , EKG no acute ST-T wave abnormalities right bundle branch block, frequent VPB's narrow QRS    Last Recorded Vitals:  Vitals:    04/18/24 0353 04/18/24 0705 04/18/24 0929 04/18/24 1512   BP: 100/52 143/70  110/56   BP Location:  Left arm  Left arm   Patient Position: Lying Lying  Sitting   Pulse: 85 52 91 59   Resp: 17 17  16   Temp: 36.6 °C (97.9 °F) 36.3 °C (97.3 °F)  36.7 °C (98.1 °F)   TempSrc: Oral Oral  Oral   SpO2: 96% 96%  94%   Weight:           Last Labs:  CBC - 4/18/2024:  4:27 AM  8.3 13.2 180    41.6      CMP - 4/18/2024:  4:27 AM  9.5 6.1 16 --- 1.9   _ 3.6 15 62      PTT - No results in last year.  _   _ _     Hemoglobin A1C   Date/Time Value Ref Range Status   08/12/2020  "10:57 AM 6.0 4.0 - 6.0 % Final     Comment:     Hemoglobin A1C levels are related to mean blood glucose during the   preceding 2-3 months. The relationship table below may be used as a   general guide. Each 1% increase in HGB A1C is a reflection of an   increase in mean glucose of approximately 30 mg/dl.   Reference: Diabetes Care, volume 29, supplement 1 Jan. 2006                        HGB A1C ................. Approx. Mean Glucose   _______________________________________________   6%   ...............................  120 mg/dl   7%   ...............................  150 mg/dl   8%   ...............................  180 mg/dl   9%   ...............................  210 mg/dl   10%  ...............................  240 mg/dl  Performed at [address] [address]       LDL Calculated   Date/Time Value Ref Range Status   08/28/2022 06:21  65 - 130 MG/DL Final   05/10/2019 01:57 AM 69 65 - 130 MG/DL Final      Last I/O:  I/O last 3 completed shifts:  In: - (0 mL/kg)   Out: 1000 (9.7 mL/kg) [Urine:1000 (0.3 mL/kg/hr)]  Weight: 103 kg     Past Cardiology Tests (Last 3 Years):  EKG:  No results found for this or any previous visit from the past 1095 days.    Echo:  Transthoracic Echo (TTE) Complete 04/16/2024    Ejection Fractions:  No results found for: \"EF\"  Cath:      Stress Test:  2022:   This is a technically adequate study.  The left ventricle is normal in size.  The right ventricle is not visualized.  There is homogeneous perfusion of the left ventricular myocardium without  fixed or reversible perfusion defect to suggest scar or ischemia.  There is no regional wall motion abnormality. There is mildly depressed  systolic function of the left ventricular myocardium.    IMPRESSION:  1. Normal myocardial perfusion study, with no evidence of ischemia or  infarction.  2. Normal-sized left ventricle, with mildly depressed systolic function and  a calculated ejection fraction of 41 %.     "   Cardiac Imaging:  No results found for this or any previous visit from the past 1095 days.      Past Medical History:  He has a past medical history of Kidney stones, Personal history of malignant neoplasm, unspecified, Personal history of other diseases of the circulatory system, Personal history of other diseases of urinary system, and Prostate cancer (Multi).    Past Surgical History:  He has a past surgical history that includes Other surgical history (10/18/2021); Other surgical history (10/18/2021); and Cataract extraction.      Social History:  He reports that he has never smoked. He has never used smokeless tobacco. No history on file for alcohol use and drug use.    Family History:  No family history on file.     Allergies:  Bactrim [sulfamethoxazole-trimethoprim], Ciprofloxacin, Dicyclomine, Meloxicam, and Rosuvastatin    Inpatient Medications:  Scheduled medications   Medication Dose Route Frequency    amoxicillin-pot clavulanate  1 tablet oral q12h VERA    aspirin  81 mg oral Daily    atorvastatin  40 mg oral Nightly    cholecalciferol  5,000 Units oral Daily    furosemide  40 mg oral Daily    guaiFENesin  600 mg oral BID    heparin (porcine)  5,000 Units subcutaneous q8h VERA    metoprolol succinate XL  12.5 mg oral BID    multivitamin  1 tablet oral Daily    potassium chloride CR  20 mEq oral Daily    psyllium  1 packet oral Daily    sacubitriL-valsartan  1 tablet oral BID     PRN medications   Medication    acetaminophen    Or    acetaminophen    Or    acetaminophen    benzocaine-menthol    guaiFENesin    magnesium hydroxide    oxygen    polyethylene glycol     Continuous Medications   Medication Dose Last Rate     Outpatient Medications:  Current Outpatient Medications   Medication Instructions    aspirin 81 mg, oral, Daily    atorvastatin calcium (ATORVASTATIN ORAL) 1 tablet, oral, Nightly    cholecalciferol (VITAMIN D-3) 25 mcg, oral, Daily    furosemide (LASIX) 40 mg, oral, Daily    multivitamin  tablet 1 tablet, oral, Daily    potassium chloride CR 20 mEq ER tablet 20 mEq, oral, Daily, Do not crush or chew.    sacubitril/valsartan (ENTRESTO ORAL) 0.5 tablets, oral, 2 times daily   Review of Systems   Constitutional: Positive for malaise/fatigue.   Cardiovascular:  Positive for chest pain, dyspnea on exertion and orthopnea.   All other systems reviewed and are negative.         Physical Exam:  On examination the patient is alert and oriented x 3, the neck veins are not elevated the obstruction volumes are slightly diminished the lungs have crackles in the right base, there is normal sounding S1 single S2 with no S3 PMI is displaced laterally, no RV lift, abdomen unremarkable, lower extremity edema is with 2+ taut edema bilaterally with palpable pulses     Assessment/Plan   78-year-old male with a new nonischemic cardiomyopathy presents with worsening heart failure despite sacubitril/valsartan and diuretic.   -Review of 2020 cardiac catheterization with left main ostial lesion 40% though recent nuclear stress testing negative for any reversible ischemia.  -May warrant repeat cardiac catheterization will discuss with Dr. Fried  -For now would continue with sacubitril/valsartan, furosemide, would add a tiny dose of beta-blocker for PVC suppression and LV function, additionally add an SGLT 2  -Will have patient follow-up in 6 weeks at that time can repeat echocardiogram will be 3 mos post drug initiation though not BB which in the past has not tolerated     Discussed with Dr. Escobar      Site Assessment Clean;Dry;Intact 04/18/24 0900   Dressing Status Clean;Dry 04/18/24 0900   Number of days: 3       Peripheral IV 04/15/24 20 G Distal;Left;Upper;Anterior Arm (Active)   Site Assessment Clean;Dry;Intact 04/18/24 0900   Dressing Status Clean;Dry 04/18/24 0900   Number of days: 3       Code Status:  Full Code    I spent 60 minutes in the professional and overall care of this patient.        Ambar Salinas,  APRN-CNP

## 2024-04-18 NOTE — PROGRESS NOTES
Subjective Data:  Patient has not symptoms of chest discomfort or shortness of breath.  I have shared the findings echocardiogram showed ejection fraction 30 to 35%.    Overnight Events:    none     Objective Data:  Last Recorded Vitals:  Vitals:    04/17/24 0928 04/17/24 1555 04/17/24 1631 04/17/24 1927   BP: (!) 112/44  120/64 147/89   BP Location: Left arm Left arm Left arm    Patient Position: Sitting Sitting Sitting Sitting   Pulse: 94 90  61   Resp: 14 12  18   Temp: 36.8 °C (98.2 °F) 36.9 °C (98.4 °F)  36.9 °C (98.5 °F)   TempSrc: Oral Oral  Oral   SpO2: 95% 98%  97%   Weight:           Last Labs:  CBC - 4/17/2024:  4:37 AM  7.2 13.0 172    40.3      CMP - 4/17/2024:  4:37 AM  9.4 6.1 16 --- 1.9   _ 3.6 15 62      PTT - No results in last year.  _   _ _     HGBA1C   Date/Time Value Ref Range Status   08/12/2020 10:57 AM 6.0 4.0 - 6.0 % Final     Comment:     Hemoglobin A1C levels are related to mean blood glucose during the   preceding 2-3 months. The relationship table below may be used as a   general guide. Each 1% increase in HGB A1C is a reflection of an   increase in mean glucose of approximately 30 mg/dl.   Reference: Diabetes Care, volume 29, supplement 1 Jan. 2006                        HGB A1C ................. Approx. Mean Glucose   _______________________________________________   6%   ...............................  120 mg/dl   7%   ...............................  150 mg/dl   8%   ...............................  180 mg/dl   9%   ...............................  210 mg/dl   10%  ...............................  240 mg/dl  Performed at [address] [address]       LDLCALC   Date/Time Value Ref Range Status   08/28/2022 06:21  65 - 130 MG/DL Final   05/10/2019 01:57 AM 69 65 - 130 MG/DL Final      Last I/O:  I/O last 3 completed shifts:  In: 120 (1.2 mL/kg) [P.O.:120]  Out: 1000 (9.7 mL/kg) [Urine:1000 (0.3 mL/kg/hr)]  Weight: 103 kg     Past Cardiology Tests (Last 3  "Years):  EKG:  No results found for this or any previous visit from the past 1095 days.    Echo:  Transthoracic Echo (TTE) Complete 04/16/2024    Ejection Fractions:  No results found for: \"EF\"  Cath:  No results found for this or any previous visit from the past 1095 days.    Stress Test:  No results found for this or any previous visit from the past 1095 days.    Cardiac Imaging:  No results found for this or any previous visit from the past 1095 days.      Inpatient Medications:  Scheduled medications   Medication Dose Route Frequency    amoxicillin-pot clavulanate  1 tablet oral q12h VERA    aspirin  81 mg oral Daily    atorvastatin  40 mg oral Nightly    cholecalciferol  5,000 Units oral Daily    furosemide  40 mg oral Daily    guaiFENesin  600 mg oral BID    heparin (porcine)  5,000 Units subcutaneous q8h VERA    multivitamin  1 tablet oral Daily    potassium chloride CR  20 mEq oral Daily    psyllium  1 packet oral Daily    sacubitriL-valsartan  1 tablet oral BID     PRN medications   Medication    acetaminophen    Or    acetaminophen    Or    acetaminophen    benzocaine-menthol    guaiFENesin    oxygen    polyethylene glycol     Continuous Medications   Medication Dose Last Rate       Physical Exam:  HEENT PERRLA neck is supple lungs clear to auscultation bilaterally heart S1-S2 present no murmurs abdomen soft and nontender extremity shows evidence of trace to 1+ edema right lower extremity and no edema in the left lower extremity     Assessment/Plan   #1 nonischemic cardiomyopathy.  Ejection fraction is in the order of 30 to 35%.  Patient was on Entresto 24/26 mg half tablet twice a day secondary to borderline soft blood pressure.  He was on Lasix and put on supplementation at home but he has not been noncompliant with these medications.  Will request ultrafiltered services to evaluate for possible Vest/AICD implantation.  Reinforced importance of taking Entresto and diuretics with potassium supplementation " postdischarge.  He receives [facility] from the [facility].  Informed the findings of echocardiogram with the patient  Peripheral IV 04/15/24 20 G Right Antecubital (Active)   Site Assessment Clean;Dry;Intact 04/17/24 0900   Dressing Status Clean;Dry 04/17/24 0900   Number of days: 2       Peripheral IV 04/15/24 20 G Distal;Left;Upper;Anterior Arm (Active)   Site Assessment Clean;Dry;Intact 04/17/24 0900   Dressing Status Clean;Dry 04/17/24 0900   Number of days: 2       Code Status:  Full Code    I spent 25 minutes in the professional and overall care of this patient.        Rafi Rm MD

## 2024-04-19 ENCOUNTER — PATIENT OUTREACH (OUTPATIENT)
Dept: CASE MANAGEMENT | Facility: HOSPITAL | Age: 79
End: 2024-04-19
Payer: MEDICARE

## 2024-04-19 LAB
BACTERIA BLD CULT: NORMAL
BACTERIA BLD CULT: NORMAL

## 2024-04-19 PROCEDURE — G0378 HOSPITAL OBSERVATION PER HR: HCPCS

## 2024-04-19 PROCEDURE — 2500000004 HC RX 250 GENERAL PHARMACY W/ HCPCS (ALT 636 FOR OP/ED): Performed by: INTERNAL MEDICINE

## 2024-04-19 PROCEDURE — 2500000001 HC RX 250 WO HCPCS SELF ADMINISTERED DRUGS (ALT 637 FOR MEDICARE OP): Performed by: INTERNAL MEDICINE

## 2024-04-19 PROCEDURE — 96372 THER/PROPH/DIAG INJ SC/IM: CPT | Performed by: INTERNAL MEDICINE

## 2024-04-19 RX ORDER — GUAIFENESIN 600 MG/1
600 TABLET, EXTENDED RELEASE ORAL EVERY 12 HOURS PRN
Qty: 20 TABLET | Refills: 0 | Status: SHIPPED | OUTPATIENT
Start: 2024-04-19 | End: 2024-05-31 | Stop reason: ALTCHOICE

## 2024-04-19 RX ORDER — AMOXICILLIN AND CLAVULANATE POTASSIUM 875; 125 MG/1; MG/1
1 TABLET, FILM COATED ORAL EVERY 12 HOURS SCHEDULED
Qty: 10 TABLET | Refills: 0 | Status: SHIPPED | OUTPATIENT
Start: 2024-04-19 | End: 2024-04-25

## 2024-04-19 RX ORDER — METOPROLOL SUCCINATE 25 MG/1
12.5 TABLET, EXTENDED RELEASE ORAL 2 TIMES DAILY
Qty: 30 TABLET | Refills: 0 | Status: SHIPPED | OUTPATIENT
Start: 2024-04-19 | End: 2024-05-31 | Stop reason: ALTCHOICE

## 2024-04-19 RX ADMIN — ACETAMINOPHEN 650 MG: 325 TABLET ORAL at 10:51

## 2024-04-19 RX ADMIN — ASPIRIN 81 MG: 81 TABLET, COATED ORAL at 10:52

## 2024-04-19 RX ADMIN — HEPARIN SODIUM 5000 UNITS: 5000 INJECTION, SOLUTION INTRAVENOUS; SUBCUTANEOUS at 21:11

## 2024-04-19 RX ADMIN — GUAIFENESIN 600 MG: 600 TABLET ORAL at 20:40

## 2024-04-19 RX ADMIN — HEPARIN SODIUM 5000 UNITS: 5000 INJECTION, SOLUTION INTRAVENOUS; SUBCUTANEOUS at 05:53

## 2024-04-19 RX ADMIN — AMOXICILLIN AND CLAVULANATE POTASSIUM 1 TABLET: 875; 125 TABLET, FILM COATED ORAL at 20:40

## 2024-04-19 RX ADMIN — HEPARIN SODIUM 5000 UNITS: 5000 INJECTION, SOLUTION INTRAVENOUS; SUBCUTANEOUS at 14:43

## 2024-04-19 RX ADMIN — MULTIVITAMIN TABLET 1 TABLET: TABLET at 10:52

## 2024-04-19 RX ADMIN — ATORVASTATIN CALCIUM 40 MG: 40 TABLET, FILM COATED ORAL at 20:40

## 2024-04-19 RX ADMIN — AMOXICILLIN AND CLAVULANATE POTASSIUM 1 TABLET: 875; 125 TABLET, FILM COATED ORAL at 10:51

## 2024-04-19 RX ADMIN — GUAIFENESIN 600 MG: 600 TABLET ORAL at 10:51

## 2024-04-19 ASSESSMENT — COGNITIVE AND FUNCTIONAL STATUS - GENERAL
MOBILITY SCORE: 24
MOBILITY SCORE: 24
DAILY ACTIVITIY SCORE: 24
DAILY ACTIVITIY SCORE: 24

## 2024-04-19 ASSESSMENT — PAIN SCALES - GENERAL
PAINLEVEL_OUTOF10: 4
PAINLEVEL_OUTOF10: 0 - NO PAIN

## 2024-04-19 ASSESSMENT — PAIN - FUNCTIONAL ASSESSMENT
PAIN_FUNCTIONAL_ASSESSMENT: 0-10
PAIN_FUNCTIONAL_ASSESSMENT: 0-10

## 2024-04-19 ASSESSMENT — PAIN DESCRIPTION - LOCATION: LOCATION: HEAD

## 2024-04-19 NOTE — CARE PLAN
The patient's goals for the shift include monitor vitals    The clinical goals for the shift include monitor hr      Problem: Psychosocial Needs  Goal: Demonstrates ability to cope with hospitalization/illness  Outcome: Progressing  Goal: Collaborate with me, my family, and caregiver to identify my specific goals  Outcome: Progressing

## 2024-04-19 NOTE — PROGRESS NOTES
Eh Saenz is a 78 y.o. male on day 0 of admission presenting with Acute on chronic heart failure, unspecified heart failure type (Multi).    Subjective    Patient was seen and examined.   Patient denied any headache or dizziness no nausea or vomiting no diarrhea, dysuria, hematuria , or frequency..       Objective     Physical Exam  HEENT:  Head externally atraumatic,  extraocular movements intact, oral mucosa moist  Neck:  Supple, no JVP, no palpable adenopathy or thyromegaly.  No carotid bruit.  Chest:  Clear to auscultation and resonant.  Heart:  Regular rate and rhythm, no murmur or gallop could be appreciated.  Abdomen:  Soft, nontender, bowel sounds present, normoactive, no palpable hepatosplenomegaly.  Extremities:  No edema, pulses present, no cyanosis or clubbing.  CNS:  Patient alert, oriented to time, place and person.    No new deficit.  Cranial nerves 2-12 grossly intact  Skin:  No active rash.  Musculoskeletal:  No  apparent joint swelling or erythema, range of movement normal.  Last Recorded Vitals  Heart Rate:  [75-83]   Temp:  [36.3 °C (97.3 °F)-36.7 °C (98 °F)]   Resp:  [16-18]   BP: ()/(40-63)   SpO2:  [92 %-97 %]     Intake/Output last 3 Shifts:  I/O last 3 completed shifts:  In: 1280 (12.4 mL/kg) [P.O.:1280]  Out: 1850 (18 mL/kg) [Urine:1850 (0.5 mL/kg/hr)]  Weight: 103 kg     Relevant Results  No results found for the last 90 days.    No results found for this or any previous visit (from the past 24 hour(s)).     Current Facility-Administered Medications:     acetaminophen (Tylenol) tablet 650 mg, 650 mg, oral, q6h PRN, 650 mg at 04/19/24 1051 **OR** acetaminophen (Tylenol) oral liquid 650 mg, 650 mg, nasogastric tube, q6h PRN **OR** acetaminophen (Tylenol) suppository 650 mg, 650 mg, rectal, q6h PRN, Thomas Parker MD    amoxicillin-pot clavulanate (Augmentin) 875-125 mg per tablet 1 tablet, 1 tablet, oral, q12h [facility], Thomas Parker MD, 1 tablet at 04/19/24 1053     aspirin EC tablet 81 mg, 81 mg, oral, Daily, Thomas Parker MD, 81 mg at 04/19/24 1052    atorvastatin (Lipitor) tablet 40 mg, 40 mg, oral, Nightly, Thomas Parker MD, 40 mg at 04/18/24 2103    benzocaine-menthol (Cepastat Sore Throat) 15-3.6 mg lozenge 1 lozenge, 1 lozenge, Mouth/Throat, q2h PRN, Thomas Parker MD    cholecalciferol (Vitamin D-3) capsule 125 mcg, 5,000 Units, oral, Daily, Thomas Parker MD, 125 mcg at 04/18/24 0929    furosemide (Lasix) tablet 40 mg, 40 mg, oral, Daily, Thomas Parker MD, 40 mg at 04/18/24 0929    guaiFENesin (Mucinex) 12 hr tablet 600 mg, 600 mg, oral, q12h PRN, Thomas Parker MD    guaiFENesin (Mucinex) 12 hr tablet 600 mg, 600 mg, oral, BID, Thomas Parker MD, 600 mg at 04/19/24 1051    heparin (porcine) injection 5,000 Units, 5,000 Units, subcutaneous, q8h VERA, Thomas Parker MD, 5,000 Units at 04/19/24 1443    magnesium hydroxide (Milk of Magnesia) 400 mg/5 mL suspension 30 mL, 30 mL, oral, Daily PRN, Thomas Parker MD, 30 mL at 04/18/24 1115    metoprolol succinate XL (Toprol-XL) 24 hr tablet 12.5 mg, 12.5 mg, oral, BID, ANANYA Solares-CNP, 12.5 mg at 04/18/24 1610    multivitamin 1 tablet, 1 tablet, oral, Daily, Thomas Parker MD, 1 tablet at 04/19/24 1052    oxygen (O2) therapy, , inhalation, Continuous PRN - O2/gases, Thomas Parker MD    polyethylene glycol (Glycolax, Miralax) packet 17 g, 17 g, oral, Daily PRN, Thomas Parker MD    potassium chloride CR (Klor-Con M20) ER tablet 20 mEq, 20 mEq, oral, Daily, Thomas Parker MD, 20 mEq at 04/18/24 0929    psyllium (Metamucil) 3.4 gram packet 1 packet, 1 packet, oral, Daily, Thomas Parker MD, 1 packet at 04/17/24 1650    sacubitriL-valsartan (Entresto) 24-26 mg per tablet 1 tablet, 1 tablet, oral, BID, Thomas Parker MD, 1 tablet at 04/18/24 2416   Assessment/Plan   Principal Problem:    Acute on chronic heart failure, unspecified heart failure  type (Multi)  Active Problems:    Elevated troponin    Chest pain    Sinusitis   Constipation   Hyperkalemia    Plan Continue current medication give supportive care.  Patient was started on Augmentin, Flonase and Mucinex.   Give physical therapy and occupation therapy.  Monitor CBC and basic metabolic panel.  Possible discharge tomorrow.      Thomas Parker MD

## 2024-04-19 NOTE — PROGRESS NOTES
Met with patient  @ bedside for CHF education reinforcement. Discussed CHF, signs and symptoms, and when to call cardiologist. Reinforced the importance of following a Low Sodium Diet and monitoring daily weight, lower leg edema, and shortness of breath. Reviewed importance of taking prescribed medications after discharge. Reinforced importance of following up with cardiologist within 2 weeks of discharge. Reminded patient that they have my contact information should any questions or concerns arise. Patient feels that he is not ready for discharge d/t his sinus/cold issues & his BP was low this AM. Updated his nurse on his c/o.  Discussed Healthy @ Home Program with patient. He will think about it.  I will continue to follow IP & post hospital discharge to help manage CHF.

## 2024-04-19 NOTE — PROGRESS NOTES
04/19/24 1800   Discharge Planning   Living Arrangements Alone   Support Systems Friends/neighbors;Children   Assistance Needed Independent with care   Type of Residence Private residence   Patient expects to be discharged to: Home   Does the patient need discharge transport arranged? No     Patient sitting up in chair, anxious about his medical conditions. Talked to him about obtaining home health care and said he would think about it. Encouraged Healthy at Home program.

## 2024-04-19 NOTE — CARE PLAN
The patient's goals for the shift include get some rest    The clinical goals for the shift include monitor hr    Problem: Safety  Goal: Patient will be injury free during hospitalization  Outcome: Progressing  Goal: I will remain free of falls  Outcome: Progressing

## 2024-04-19 NOTE — NURSING NOTE
Assumed care of patient. Patient in bed resting comfortably. No signs of distress or pain. Call light and belongings within reach.

## 2024-04-19 NOTE — PROGRESS NOTES
Eh Saenz is a 78 y.o. male on day 0 of admission presenting with Acute on chronic heart failure, unspecified heart failure type (Multi).    Subjective     The patient was seen and examined.  Patient was complaining of headache, dizziness and cough.       Objective     Physical Exam  HEENT:  Head externally atraumatic,  extraocular movements intact, oral mucosa moist .  Sinus congestion present.  Neck:  Supple, no JVP, no palpable adenopathy or thyromegaly.  No carotid bruit.  Chest:  Clear to auscultation and resonant.  Heart:  Regular rate and rhythm, no murmur or gallop could be appreciated.  Abdomen:  Soft, nontender, bowel sounds present, normoactive, no palpable hepatosplenomegaly.  Extremities:  No edema, pulses present, no cyanosis or clubbing.  CNS:  Patient alert, oriented to time, place and person.    No new deficit.  Cranial nerves 2-12 grossly intact  Skin:  No active rash.  Musculoskeletal:  No  apparent joint swelling or erythema, range of movement normal.  Last Recorded Vitals  Heart Rate:  [75-83]   Temp:  [36.3 °C (97.3 °F)-36.7 °C (98 °F)]   Resp:  [16-18]   BP: ()/(40-63)   SpO2:  [92 %-97 %]     Intake/Output last 3 Shifts:  I/O last 3 completed shifts:  In: 1280 (12.4 mL/kg) [P.O.:1280]  Out: 1850 (18 mL/kg) [Urine:1850 (0.5 mL/kg/hr)]  Weight: 103 kg     Relevant Results  No results found for the last 90 days.    No results found for this or any previous visit (from the past 24 hour(s)).     Current Facility-Administered Medications:     acetaminophen (Tylenol) tablet 650 mg, 650 mg, oral, q6h PRN, 650 mg at 04/19/24 1051 **OR** acetaminophen (Tylenol) oral liquid 650 mg, 650 mg, nasogastric tube, q6h PRN **OR** acetaminophen (Tylenol) suppository 650 mg, 650 mg, rectal, q6h PRN, Thomas Parker MD    amoxicillin-pot clavulanate (Augmentin) 875-125 mg per tablet 1 tablet, 1 tablet, oral, q12h VERA, Thomas Parker MD, 1 tablet at 04/19/24 1051    aspirin EC tablet 81 mg,  81 mg, oral, Daily, Thomas Parker MD, 81 mg at 04/19/24 1052    atorvastatin (Lipitor) tablet 40 mg, 40 mg, oral, Nightly, Thomas Parker MD, 40 mg at 04/18/24 2103    benzocaine-menthol (Cepastat Sore Throat) 15-3.6 mg lozenge 1 lozenge, 1 lozenge, Mouth/Throat, q2h PRN, Thomas Parker MD    cholecalciferol (Vitamin D-3) capsule 125 mcg, 5,000 Units, oral, Daily, Thomas Parker MD, 125 mcg at 04/18/24 0929    furosemide (Lasix) tablet 40 mg, 40 mg, oral, Daily, Thomas Parker MD, 40 mg at 04/18/24 0929    guaiFENesin (Mucinex) 12 hr tablet 600 mg, 600 mg, oral, q12h PRN, Thomas Parker MD    guaiFENesin (Mucinex) 12 hr tablet 600 mg, 600 mg, oral, BID, Thomas Parker MD, 600 mg at 04/19/24 1051    heparin (porcine) injection 5,000 Units, 5,000 Units, subcutaneous, q8h VERA, Thomas Parker MD, 5,000 Units at 04/19/24 1443    magnesium hydroxide (Milk of Magnesia) 400 mg/5 mL suspension 30 mL, 30 mL, oral, Daily PRN, Thomas Parker MD, 30 mL at 04/18/24 1115    metoprolol succinate XL (Toprol-XL) 24 hr tablet 12.5 mg, 12.5 mg, oral, BID, ANANYA Solares-CNP, 12.5 mg at 04/18/24 1610    multivitamin 1 tablet, 1 tablet, oral, Daily, Thomas Parker MD, 1 tablet at 04/19/24 1052    oxygen (O2) therapy, , inhalation, Continuous PRN - O2/gases, Thomas Parker MD    polyethylene glycol (Glycolax, Miralax) packet 17 g, 17 g, oral, Daily PRN, Thomas Parker MD    potassium chloride CR (Klor-Con M20) ER tablet 20 mEq, 20 mEq, oral, Daily, Thomas Parker MD, 20 mEq at 04/18/24 0929    psyllium (Metamucil) 3.4 gram packet 1 packet, 1 packet, oral, Daily, Thomas Parker MD, 1 packet at 04/17/24 1650    sacubitriL-valsartan (Entresto) 24-26 mg per tablet 1 tablet, 1 tablet, oral, BID, Thomas Parker MD, 1 tablet at 04/18/24 9761   Assessment/Plan   Principal Problem:    Acute on chronic heart failure, unspecified heart failure type (Multi)  Active  Problems:    Elevated troponin    Chest pain    Chest pain  Sinusitis   Constipation   COPD  H/O Ca Prostate    Plan   Continue current medication.  Currently antibiotics.  Discharge were discussed with the patient.  Patient refused to go home.  Monitor pulse ox.  Patient can be discharged    When cleared by consultants.         Thomas Parker MD

## 2024-04-20 ENCOUNTER — PHARMACY VISIT (OUTPATIENT)
Dept: PHARMACY | Facility: CLINIC | Age: 79
End: 2024-04-20

## 2024-04-20 VITALS
DIASTOLIC BLOOD PRESSURE: 82 MMHG | HEART RATE: 77 BPM | TEMPERATURE: 97.5 F | BODY MASS INDEX: 32.57 KG/M2 | RESPIRATION RATE: 18 BRPM | WEIGHT: 227 LBS | SYSTOLIC BLOOD PRESSURE: 116 MMHG | OXYGEN SATURATION: 100 %

## 2024-04-20 LAB — GLUCOSE BLD MANUAL STRIP-MCNC: 67 MG/DL (ref 74–99)

## 2024-04-20 PROCEDURE — RXMED WILLOW AMBULATORY MEDICATION CHARGE

## 2024-04-20 PROCEDURE — G0378 HOSPITAL OBSERVATION PER HR: HCPCS

## 2024-04-20 PROCEDURE — 2500000001 HC RX 250 WO HCPCS SELF ADMINISTERED DRUGS (ALT 637 FOR MEDICARE OP): Performed by: REGISTERED NURSE

## 2024-04-20 PROCEDURE — 96372 THER/PROPH/DIAG INJ SC/IM: CPT | Performed by: INTERNAL MEDICINE

## 2024-04-20 PROCEDURE — 2500000001 HC RX 250 WO HCPCS SELF ADMINISTERED DRUGS (ALT 637 FOR MEDICARE OP): Performed by: INTERNAL MEDICINE

## 2024-04-20 PROCEDURE — 2500000006 HC RX 250 W HCPCS SELF ADMINISTERED DRUGS (ALT 637 FOR ALL PAYERS): Performed by: INTERNAL MEDICINE

## 2024-04-20 PROCEDURE — 82947 ASSAY GLUCOSE BLOOD QUANT: CPT

## 2024-04-20 PROCEDURE — 2500000004 HC RX 250 GENERAL PHARMACY W/ HCPCS (ALT 636 FOR OP/ED): Performed by: INTERNAL MEDICINE

## 2024-04-20 RX ADMIN — POTASSIUM CHLORIDE 20 MEQ: 1500 TABLET, EXTENDED RELEASE ORAL at 09:14

## 2024-04-20 RX ADMIN — METOPROLOL SUCCINATE 12.5 MG: 25 TABLET, FILM COATED, EXTENDED RELEASE ORAL at 09:14

## 2024-04-20 RX ADMIN — GUAIFENESIN 600 MG: 600 TABLET ORAL at 09:14

## 2024-04-20 RX ADMIN — Medication 125 MCG: at 09:14

## 2024-04-20 RX ADMIN — AMOXICILLIN AND CLAVULANATE POTASSIUM 1 TABLET: 875; 125 TABLET, FILM COATED ORAL at 09:14

## 2024-04-20 RX ADMIN — HEPARIN SODIUM 5000 UNITS: 5000 INJECTION, SOLUTION INTRAVENOUS; SUBCUTANEOUS at 05:22

## 2024-04-20 RX ADMIN — MULTIVITAMIN TABLET 1 TABLET: TABLET at 09:14

## 2024-04-20 RX ADMIN — ASPIRIN 81 MG: 81 TABLET, COATED ORAL at 09:14

## 2024-04-20 RX ADMIN — HEPARIN SODIUM 5000 UNITS: 5000 INJECTION, SOLUTION INTRAVENOUS; SUBCUTANEOUS at 13:35

## 2024-04-20 RX ADMIN — FUROSEMIDE 40 MG: 40 TABLET ORAL at 09:14

## 2024-04-20 RX ADMIN — SACUBITRIL AND VALSARTAN 1 TABLET: 24; 26 TABLET, FILM COATED ORAL at 09:14

## 2024-04-20 ASSESSMENT — PAIN SCALES - GENERAL: PAINLEVEL_OUTOF10: 0 - NO PAIN

## 2024-04-20 NOTE — PROGRESS NOTES
Eh Saenz is a 78 y.o. male on day 0 of admission presenting with Acute on chronic heart failure, unspecified heart failure type (Multi).    Subjective   Patient was seen and examined.  Lying in the bed.  Comfortable.  Not in acute distress.  Patient breathing is better than before.       Objective     Physical Exam  HEENT:  Head externally atraumatic,  extraocular movements intact, oral mucosa moist  Neck:  Supple, no JVP, no palpable adenopathy or thyromegaly.  No carotid bruit.  Chest:  Clear to auscultation and resonant.  Heart:  Regular rate and rhythm, no murmur or gallop could be appreciated.  Abdomen:  Soft, nontender, bowel sounds present, normoactive, no palpable hepatosplenomegaly.  Extremities:  No edema, pulses present, no cyanosis or clubbing.  CNS:  Patient alert, oriented to time, place and person.    No new deficit.  Cranial nerves 2-12 grossly intact  Skin:  No active rash.  Musculoskeletal:  No  apparent joint swelling or erythema, range of movement normal.  Last Recorded Vitals  Heart Rate:  [77-86]   Temp:  [36.4 °C (97.5 °F)-36.9 °C (98.4 °F)]   Resp:  [16-20]   BP: (116-129)/(51-82)   SpO2:  [95 %-100 %]     Intake/Output last 3 Shifts:  I/O last 3 completed shifts:  In: 1290 (12.5 mL/kg) [P.O.:1290]  Out: 2250 (21.9 mL/kg) [Urine:2250 (0.6 mL/kg/hr)]  Weight: 103 kg     Relevant Results  No results found for the last 90 days.    Results for orders placed or performed during the hospital encounter of 04/15/24 (from the past 24 hour(s))   POCT GLUCOSE   Result Value Ref Range    POCT Glucose 67 (L) 74 - 99 mg/dL        Current Facility-Administered Medications:     acetaminophen (Tylenol) tablet 650 mg, 650 mg, oral, q6h PRN, 650 mg at 04/19/24 1051 **OR** acetaminophen (Tylenol) oral liquid 650 mg, 650 mg, nasogastric tube, q6h PRN **OR** acetaminophen (Tylenol) suppository 650 mg, 650 mg, rectal, q6h PRN, Thomas Parker MD    amoxicillin-pot clavulanate (Augmentin) 875-125 mg  per tablet 1 tablet, 1 tablet, oral, q12h VERA, Thomas Parker MD, 1 tablet at 04/20/24 0914    aspirin EC tablet 81 mg, 81 mg, oral, Daily, Thomas Parker MD, 81 mg at 04/20/24 0914    atorvastatin (Lipitor) tablet 40 mg, 40 mg, oral, Nightly, Thomas Parker MD, 40 mg at 04/19/24 2040    benzocaine-menthol (Cepastat Sore Throat) 15-3.6 mg lozenge 1 lozenge, 1 lozenge, Mouth/Throat, q2h PRN, Thomas Parker MD    cholecalciferol (Vitamin D-3) capsule 125 mcg, 5,000 Units, oral, Daily, Thomas Parker MD, 125 mcg at 04/20/24 0914    furosemide (Lasix) tablet 40 mg, 40 mg, oral, Daily, Thomas Parker MD, 40 mg at 04/20/24 0914    guaiFENesin (Mucinex) 12 hr tablet 600 mg, 600 mg, oral, q12h PRN, Thomas Parker MD    guaiFENesin (Mucinex) 12 hr tablet 600 mg, 600 mg, oral, BID, Thomas Parker MD, 600 mg at 04/20/24 0914    heparin (porcine) injection 5,000 Units, 5,000 Units, subcutaneous, q8h VERA, Thomas Parker MD, 5,000 Units at 04/20/24 1335    magnesium hydroxide (Milk of Magnesia) 400 mg/5 mL suspension 30 mL, 30 mL, oral, Daily PRN, Thomas Parker MD, 30 mL at 04/18/24 1115    metoprolol succinate XL (Toprol-XL) 24 hr tablet 12.5 mg, 12.5 mg, oral, BID, ELROY Solares, 12.5 mg at 04/20/24 0914    multivitamin 1 tablet, 1 tablet, oral, Daily, Thomas Parker MD, 1 tablet at 04/20/24 0914    oxygen (O2) therapy, , inhalation, Continuous PRN - O2/gases, Thomas Parker MD    polyethylene glycol (Glycolax, Miralax) packet 17 g, 17 g, oral, Daily PRN, Thomas Parker MD    potassium chloride CR (Klor-Con M20) ER tablet 20 mEq, 20 mEq, oral, Daily, Thomas Parker MD, 20 mEq at 04/20/24 0914    psyllium (Metamucil) 3.4 gram packet 1 packet, 1 packet, oral, Daily, Thomas Parker MD, 1 packet at 04/17/24 1650    sacubitriL-valsartan (Entresto) 24-26 mg per tablet 1 tablet, 1 tablet, oral, BID, Thomas Parker MD, 1 tablet at 04/20/24 0914    Assessment/Plan   Principal Problem:    Acute on chronic heart failure, unspecified heart failure type (Multi)  Active Problems:    Elevated troponin    Chest pain  Dyspnea exertion  Congestive heart failure  COPD  Sinusitis  History of prostate cancer    Plan: Continue current medication.  Supportive care. Patient feeling better now.  Patient being discharged in a stable condition.         Thomas Parker MD

## 2024-04-20 NOTE — CARE PLAN
The patient's goals for the shift include get some rest    The clinical goals for the shift include safety      Problem: Pain  Goal: My pain/discomfort is manageable  Outcome: Progressing     Problem: Safety  Goal: Patient will be injury free during hospitalization  Outcome: Progressing     Problem: Daily Care  Goal: Daily care needs are met  Outcome: Progressing     Problem: Psychosocial Needs  Goal: Demonstrates ability to cope with hospitalization/illness  Outcome: Progressing  Goal: Collaborate with me, my family, and caregiver to identify my specific goals  Outcome: Progressing     Problem: Discharge Barriers  Goal: My discharge needs are met  Outcome: Progressing

## 2024-04-20 NOTE — NURSING NOTE
Assumed care of patient.  Patient sitting up in chair, with legs raised,  BSSR completed.  Bed low and call light within reach.

## 2024-04-20 NOTE — NURSING NOTE
[facility] called, by , for shuttle service for patient.  They will call the unit when they have an approximate time for .

## 2024-04-20 NOTE — NURSING NOTE
Pt refused Entresto and Metoprolol does not feel comfortable taking it at night and had low b/p this morning.  He would prefer to discuss with Dr Mckee. He is not comfortable taking these at bedtime.    Pt was educated on both medications and given handouts of the medications.

## 2024-04-20 NOTE — DISCHARGE SUMMARY
Discharge Diagnosis  Acute on chronic heart failure, unspecified heart failure type (Multi)    Issues Requiring Follow-Up  Acute on chronic systolic congestive heart failure  Acute sinusitis  Coronary artery disease  Hyperlipidemia  Hypertension    Discharge Meds     Your medication list        START taking these medications        Instructions Last Dose Given Next Dose Due   amoxicillin-pot clavulanate 875-125 mg tablet  Commonly known as: Augmentin      Take 1 tablet by mouth every 12 hours for 5 days.       metoprolol succinate XL 25 mg 24 hr tablet  Commonly known as: Toprol-XL      Take 0.5 tablets (12.5 mg) by mouth 2 times a day. Do not crush or chew.       Mucinex 600 mg 12 hr tablet  Generic drug: guaiFENesin      Take 1 tablet (600 mg) by mouth every 12 hours if needed for congestion. Do not crush, chew, or split.              CONTINUE taking these medications        Instructions Last Dose Given Next Dose Due   aspirin 81 mg EC tablet           ATORVASTATIN ORAL           cholecalciferol 25 MCG (1000 UT) capsule  Commonly known as: Vitamin D-3           ENTRESTO ORAL           furosemide 40 mg tablet  Commonly known as: Lasix           multivitamin tablet           potassium chloride CR 20 mEq ER tablet  Commonly known as: Klor-Con M20                     Where to Get Your Medications        These medications were sent to [facility]  [address], [address], [facility] [place] [address]      Hours: 9 AM to 6 PM Mon-Fri, 9 AM to 1 PM Sat Phone: [phone]   amoxicillin-pot clavulanate 875-125 mg tablet  metoprolol succinate XL 25 mg 24 hr tablet  Mucinex 600 mg 12 hr tablet         Test Results Pending At Discharge  Pending Labs       Order Current Status    Extra Urine Gray Tube Collected (04/15/24 1104)    Urinalysis with Reflex Culture and Microscopic In process            Hospital Course  Patient was admitted complaint of shortness of breath.  Patient was found to have a acute on chronic  congestive heart failure.  Patient was started on IV diuretic but anticoagulated.  Patient had a low ejection fraction.  Patient was seen by electrophysiology.  It was advised to follow-up as an outpatient.  Patient started on the low-dose Lopressor.  Patient was started on Augmentin and Mucinex for sinusitis.  Patient also started on Flonase.  Patient is being discharged home discharge condition with advised to follow-up as an outpatient.    Pertinent Physical Exam At Time of Discharge  Physical Exam    Outpatient Follow-Up  Future Appointments   Date Time Provider Department Center   5/23/2024  8:30 AM Eh Escobar MD [id number] None   6/5/2024  1:45 PM Jarrod Patterson MD [id number] [facility]         Thomas Parker MD

## 2024-04-20 NOTE — CARE PLAN
Problem: Safety  Goal: I will remain free of falls  Outcome: Met     Problem: Pain - Adult  Goal: Verbalizes/displays adequate comfort level or baseline comfort level  Outcome: Progressing   The patient's goals for the shift include get some rest    The clinical goals for the shift include safety    Over the shift, the patient did make progress toward the above goals.

## 2024-04-26 ENCOUNTER — PATIENT OUTREACH (OUTPATIENT)
Dept: CASE MANAGEMENT | Facility: HOSPITAL | Age: 79
End: 2024-04-26
Payer: MEDICARE

## 2024-04-26 NOTE — PROGRESS NOTES
Follow up phone call made by [facility] Clinical Nurse Navigator. No answer. Left message for CB

## 2024-05-03 ENCOUNTER — PATIENT OUTREACH (OUTPATIENT)
Dept: CASE MANAGEMENT | Facility: HOSPITAL | Age: 79
End: 2024-05-03
Payer: MEDICARE

## 2024-05-03 NOTE — PROGRESS NOTES
Questions for Outreach call to CHF patient in HF Nurse Navigator Program:  Have follow up appointments been scheduled for Cardiologist? completed  Are daily weights being completed? yes  Is low sodium diet for symptom control being followed? yes  Are all medications being taken with no issues/questions? yes  Are you experiencing any HF symptoms? No  Do you know when to reach out to the doctor? yes  Comments: Spoke with Patrick reports he is feeling better. Doing well managing his CHF. I will continue to follow to help manage CHF.

## 2024-05-06 LAB
ATRIAL RATE: 88 BPM
ATRIAL RATE: 93 BPM
P AXIS: 41 DEGREES
P AXIS: 42 DEGREES
P OFFSET: 176 MS
P OFFSET: 185 MS
P ONSET: 120 MS
P ONSET: 121 MS
PR INTERVAL: 206 MS
PR INTERVAL: 212 MS
Q ONSET: 224 MS
Q ONSET: 226 MS
QRS COUNT: 14 BEATS
QRS COUNT: 15 BEATS
QRS DURATION: 102 MS
QRS DURATION: 96 MS
QT INTERVAL: 384 MS
QT INTERVAL: 394 MS
QTC CALCULATION(BAZETT): 464 MS
QTC CALCULATION(BAZETT): 489 MS
QTC FREDERICIA: 436 MS
QTC FREDERICIA: 456 MS
R AXIS: -33 DEGREES
R AXIS: -36 DEGREES
T AXIS: 27 DEGREES
T AXIS: 45 DEGREES
T OFFSET: 418 MS
T OFFSET: 421 MS
VENTRICULAR RATE: 88 BPM
VENTRICULAR RATE: 93 BPM

## 2024-05-09 ENCOUNTER — PATIENT OUTREACH (OUTPATIENT)
Dept: CASE MANAGEMENT | Facility: HOSPITAL | Age: 79
End: 2024-05-09
Payer: MEDICARE

## 2024-05-09 NOTE — PROGRESS NOTES
Questions for Outreach call to CHF patient in HF Nurse Navigator Program:  Have follow up appointments been scheduled for Cardiologist? Completed, seen vis Dr Rm 2 weeks ago.  Are daily weights being completed? yes  Is low sodium diet for symptom control being followed? yes  Are all medications being taken with no issues/questions? yes  Are you experiencing any HF symptoms? no  Do you know when to reach out to the doctor? Yes,Reviewed HF flare up symptoms to report to MD.  Comments: Spoke with Patrick reports he is doing well managing his CHF. I will continue to follow to help manage CHF.

## 2024-05-22 ENCOUNTER — PATIENT OUTREACH (OUTPATIENT)
Dept: CASE MANAGEMENT | Facility: HOSPITAL | Age: 79
End: 2024-05-22
Payer: MEDICARE

## 2024-05-22 NOTE — PROGRESS NOTES
Questions for Outreach call to CHF patient in HF Nurse Navigator Program:  Have follow up appointments been scheduled for Cardiologist? Yes, Completed follow up with Dr Rm  Are daily weights being completed? yes  Is low sodium diet for symptom control being followed? yes  Are all medications being taken with no issues/questions? Yes, reports low /58  Are you experiencing any HF symptoms? no  Do you know when to reach out to the doctor? Yes, reviewed HF flare up symptoms to report to MD.  Comments: Spoke with Patrick reports he is doing well managing his CHF. Weight is down to 218 lbs. C/O low BP & feeling tired. Instructed him to call his PCP or Cardiologist. Also suggested to check his BP prior to taking Toprol -XL, if SBP < 110 to hold, until he is able to talk with MD.  Refused to go ER. I will continue to follow

## 2024-05-23 ENCOUNTER — APPOINTMENT (OUTPATIENT)
Dept: CARDIOLOGY | Facility: CLINIC | Age: 79
End: 2024-05-23
Payer: MEDICARE

## 2024-05-29 ENCOUNTER — PATIENT OUTREACH (OUTPATIENT)
Dept: CASE MANAGEMENT | Facility: HOSPITAL | Age: 79
End: 2024-05-29
Payer: MEDICARE

## 2024-05-29 NOTE — PROGRESS NOTES
Questions for Outreach call to CHF patient in HF Nurse Navigator Program:  Have follow up appointments been scheduled for Cardiologist? Yes. Completed follow up with Dr Rm. Appointment scheduled with Dr Escobar 5/31/24  Are daily weights being completed? yes  Is low sodium diet for symptom control being followed? yes  Are all medications being taken with no issues/questions? Taking all medications, except Toprol-XL d/t low BP & feeling tired.  Are you experiencing any HF symptoms? no  Do you know when to reach out to the doctor? Yes, Verbalized HF flare up symptoms to report.  Comments: Spoke with Patrick reports that he is doing well managing his CHF. C/O low BP & feeling weak. On my last HF follow up call we discussed his low SBP (90/105). Hold Toprol for SBP < 110 & call his Cardiologist. Patrick reports that he has not  taken Toprol since last week d/t low BP & has not notified his Cardiologist. He will address low BP with Dr Escobar Friday.  Instructed him to call 911 if symptoms get worse. I will continue to follow.

## 2024-05-31 ENCOUNTER — OFFICE VISIT (OUTPATIENT)
Dept: CARDIOLOGY | Facility: CLINIC | Age: 79
End: 2024-05-31
Payer: MEDICARE

## 2024-05-31 VITALS — HEART RATE: 90 BPM | SYSTOLIC BLOOD PRESSURE: 140 MMHG | DIASTOLIC BLOOD PRESSURE: 78 MMHG

## 2024-05-31 DIAGNOSIS — I50.9 ACUTE ON CHRONIC HEART FAILURE, UNSPECIFIED HEART FAILURE TYPE (MULTI): Primary | ICD-10-CM

## 2024-05-31 PROCEDURE — 99214 OFFICE O/P EST MOD 30 MIN: CPT | Performed by: INTERNAL MEDICINE

## 2024-05-31 PROCEDURE — 1159F MED LIST DOCD IN RCRD: CPT | Performed by: INTERNAL MEDICINE

## 2024-05-31 PROCEDURE — 93005 ELECTROCARDIOGRAM TRACING: CPT | Performed by: INTERNAL MEDICINE

## 2024-05-31 PROCEDURE — 93010 ELECTROCARDIOGRAM REPORT: CPT | Performed by: INTERNAL MEDICINE

## 2024-05-31 RX ORDER — CARVEDILOL 3.12 MG/1
3.12 TABLET ORAL
Qty: 60 TABLET | Refills: 11 | Status: SHIPPED | OUTPATIENT
Start: 2024-05-31 | End: 2025-05-31

## 2024-05-31 ASSESSMENT — PATIENT HEALTH QUESTIONNAIRE - PHQ9
2. FEELING DOWN, DEPRESSED OR HOPELESS: NOT AT ALL
SUM OF ALL RESPONSES TO PHQ9 QUESTIONS 1 AND 2: 0
1. LITTLE INTEREST OR PLEASURE IN DOING THINGS: NOT AT ALL

## 2024-05-31 ASSESSMENT — COLUMBIA-SUICIDE SEVERITY RATING SCALE - C-SSRS
1. IN THE PAST MONTH, HAVE YOU WISHED YOU WERE DEAD OR WISHED YOU COULD GO TO SLEEP AND NOT WAKE UP?: NO
6. HAVE YOU EVER DONE ANYTHING, STARTED TO DO ANYTHING, OR PREPARED TO DO ANYTHING TO END YOUR LIFE?: NO
2. HAVE YOU ACTUALLY HAD ANY THOUGHTS OF KILLING YOURSELF?: NO

## 2024-05-31 NOTE — ASSESSMENT & PLAN NOTE
History as above.  I would initiate low-dose carvedilol to see if he tolerates this.  He should have reevaluation of his ejection fraction in 2 months or so to see if he is having any improvement before deciding on the need for an ICD.  He continues to have class II-III symptoms but is mostly limited by his arthritic pain.

## 2024-06-04 ENCOUNTER — PATIENT OUTREACH (OUTPATIENT)
Dept: CASE MANAGEMENT | Facility: HOSPITAL | Age: 79
End: 2024-06-04
Payer: MEDICARE

## 2024-06-04 NOTE — PROGRESS NOTES
Questions for Outreach call to CHF patient in HF Nurse Navigator Program:  Have follow up appointments been scheduled for Cardiologist? completed  Are daily weights being completed? yes  Is low sodium diet for symptom control being followed? yes  Are all medications being taken with no issues/questions? yes  Are you experiencing any HF symptoms? no  Do you know when to reach out to the doctor? Yes, verbalized HF flare up symptoms to report.  Comments: Spoke with Patrick reports he is doing well managing his CHF. Has completed follow up appointments with his Cardiologist, Dr Rm & Dr Escobar. All HF goals met. Closing HF case today.

## 2024-06-05 ENCOUNTER — APPOINTMENT (OUTPATIENT)
Dept: OTOLARYNGOLOGY | Facility: CLINIC | Age: 79
End: 2024-06-05
Payer: MEDICARE

## 2024-08-01 ENCOUNTER — OFFICE VISIT (OUTPATIENT)
Dept: CARDIOLOGY | Facility: CLINIC | Age: 79
End: 2024-08-01
Payer: MEDICARE

## 2024-08-01 VITALS
HEIGHT: 70 IN | RESPIRATION RATE: 18 BRPM | DIASTOLIC BLOOD PRESSURE: 64 MMHG | TEMPERATURE: 98 F | BODY MASS INDEX: 31.92 KG/M2 | HEART RATE: 88 BPM | OXYGEN SATURATION: 97 % | SYSTOLIC BLOOD PRESSURE: 126 MMHG | WEIGHT: 223 LBS

## 2024-08-01 DIAGNOSIS — I50.9 ACUTE ON CHRONIC HEART FAILURE, UNSPECIFIED HEART FAILURE TYPE (MULTI): Primary | ICD-10-CM

## 2024-08-01 PROCEDURE — 99214 OFFICE O/P EST MOD 30 MIN: CPT | Performed by: INTERNAL MEDICINE

## 2024-08-01 PROCEDURE — 1159F MED LIST DOCD IN RCRD: CPT | Performed by: INTERNAL MEDICINE

## 2024-08-01 PROCEDURE — 1126F AMNT PAIN NOTED NONE PRSNT: CPT | Performed by: INTERNAL MEDICINE

## 2024-08-01 PROCEDURE — 93005 ELECTROCARDIOGRAM TRACING: CPT | Performed by: INTERNAL MEDICINE

## 2024-08-01 PROCEDURE — 93010 ELECTROCARDIOGRAM REPORT: CPT | Performed by: INTERNAL MEDICINE

## 2024-08-01 ASSESSMENT — PAIN SCALES - GENERAL: PAINLEVEL: 0-NO PAIN

## 2024-08-01 ASSESSMENT — PATIENT HEALTH QUESTIONNAIRE - PHQ9
SUM OF ALL RESPONSES TO PHQ9 QUESTIONS 1 AND 2: 0
1. LITTLE INTEREST OR PLEASURE IN DOING THINGS: NOT AT ALL
2. FEELING DOWN, DEPRESSED OR HOPELESS: NOT AT ALL

## 2024-08-01 ASSESSMENT — LIFESTYLE VARIABLES
HOW OFTEN DO YOU HAVE SIX OR MORE DRINKS ON ONE OCCASION: NEVER
HOW OFTEN DO YOU HAVE A DRINK CONTAINING ALCOHOL: MONTHLY OR LESS
AUDIT TOTAL SCORE: 1
HAVE YOU OR SOMEONE ELSE BEEN INJURED AS A RESULT OF YOUR DRINKING: NO
HAS A RELATIVE, FRIEND, DOCTOR, OR ANOTHER HEALTH PROFESSIONAL EXPRESSED CONCERN ABOUT YOUR DRINKING OR SUGGESTED YOU CUT DOWN: NO
HOW MANY STANDARD DRINKS CONTAINING ALCOHOL DO YOU HAVE ON A TYPICAL DAY: 1 OR 2
AUDIT-C TOTAL SCORE: 1
SKIP TO QUESTIONS 9-10: 1

## 2024-08-01 ASSESSMENT — ENCOUNTER SYMPTOMS
OCCASIONAL FEELINGS OF UNSTEADINESS: 0
LOSS OF SENSATION IN FEET: 0
DEPRESSION: 0

## 2024-08-01 NOTE — PROGRESS NOTES
Chief Complaint   Patient presents with    Follow-up            The patient is an 78-year-old male with a history of a nonischemic cardiomyopathy who is here in fairly close follow-up.  He was just started on beta-blockers 2 months ago with hopes of guideline directed therapy helping his LV systolic function.  He has frequent PVCs as well.  He notes that he has been somewhat intolerant of Beta-blockers and Entresto with blood pressures with systolics in the 80 mmHg range however he has no lightheadedness or dizziness.  He has quite a bit of anxiety associate with the possibility of requiring an ICD.  He notes that he has been taking his medicines about 75% of the time because of his blood pressure concerns.       Active Ambulatory Problems     Diagnosis Date Noted    Acute on chronic heart failure, unspecified heart failure type (Multi) 04/15/2024    Elevated troponin 04/15/2024    Chest pain 04/15/2024     Resolved Ambulatory Problems     Diagnosis Date Noted    No Resolved Ambulatory Problems     Past Medical History:   Diagnosis Date    Kidney stones     Personal history of malignant neoplasm, unspecified     Personal history of other diseases of the circulatory system     Personal history of other diseases of urinary system     Prostate cancer (Multi)         Review of Systems   All other systems reviewed and are negative.       Objective     There were no vitals filed for this visit.     Vitals and nursing note reviewed.   Constitutional:       Appearance: Healthy appearance. Obese.   HENT:    Mouth/Throat:      Pharynx: Oropharynx is clear.   Pulmonary:      Effort: Pulmonary effort is normal.      Breath sounds: Normal breath sounds.   Cardiovascular:      PMI at left midclavicular line. Normal rate. Regular rhythm. Normal S1. Normal S2.       Murmurs: There is a grade 2/6 holosystolic murmur.      No gallop.  No click. No rub.   Pulses:     Intact distal pulses.   Edema:     Peripheral edema absent.    Abdominal:      General: Bowel sounds are normal.   Musculoskeletal:      Cervical back: Normal range of motion. Skin:     General: Skin is warm and dry.   Neurological:      General: No focal deficit present.      Mental Status: Alert and oriented to person, place and time.          Lab Review:   No visits with results within 2 Month(s) from this visit.   Latest known visit with results is:   No results displayed because visit has over 200 results.          ECG:  Sinus rhythm at 85 bpm with first-degree AV block MI interval 220 ms QRS duration 110 ms QTc 447 ms      Assessment/plan:  History as noted.  I have tried to encourage the patient to be compliant with his medications given the possibility of EF improvement    Problem List Items Addressed This Visit       Acute on chronic heart failure, unspecified heart failure type (Multi) - Primary

## 2024-08-12 ENCOUNTER — TELEPHONE (OUTPATIENT)
Dept: CARDIOLOGY | Facility: CLINIC | Age: 79
End: 2024-08-12
Payer: MEDICARE

## 2024-08-12 DIAGNOSIS — I50.9 ACUTE ON CHRONIC HEART FAILURE, UNSPECIFIED HEART FAILURE TYPE (MULTI): Primary | ICD-10-CM

## 2024-08-14 ENCOUNTER — APPOINTMENT (OUTPATIENT)
Dept: OTOLARYNGOLOGY | Facility: CLINIC | Age: 79
End: 2024-08-14
Payer: MEDICARE

## 2024-08-14 VITALS — HEIGHT: 70 IN | WEIGHT: 223 LBS | BODY MASS INDEX: 31.92 KG/M2

## 2024-08-14 DIAGNOSIS — H61.23 BILATERAL IMPACTED CERUMEN: Primary | ICD-10-CM

## 2024-08-14 DIAGNOSIS — H90.3 BILATERAL SENSORINEURAL HEARING LOSS: ICD-10-CM

## 2024-08-14 PROCEDURE — 99213 OFFICE O/P EST LOW 20 MIN: CPT | Performed by: OTOLARYNGOLOGY

## 2024-08-14 PROCEDURE — 1160F RVW MEDS BY RX/DR IN RCRD: CPT | Performed by: OTOLARYNGOLOGY

## 2024-08-14 PROCEDURE — 1036F TOBACCO NON-USER: CPT | Performed by: OTOLARYNGOLOGY

## 2024-08-14 PROCEDURE — 1159F MED LIST DOCD IN RCRD: CPT | Performed by: OTOLARYNGOLOGY

## 2024-08-14 NOTE — PROGRESS NOTES
"HPI  Eh Saenz is a 78 y.o. male here for routine cerumen management.  He has baseline sensorineural hearing loss but does not want expensive hearing aids.  Denies otalgia and otorrhea.      Past Medical History:   Diagnosis Date    Kidney stones     Personal history of malignant neoplasm, unspecified     History of malignant neoplasm    Personal history of other diseases of the circulatory system     History of hypertension    Personal history of other diseases of urinary system     History of kidney disease    Prostate cancer (Multi)             Medications:     Current Outpatient Medications:     carvedilol (Coreg) 3.125 mg tablet, Take 1 tablet (3.125 mg) by mouth 2 times daily (morning and late afternoon)., Disp: 60 tablet, Rfl: 11    furosemide (Lasix) 40 mg tablet, Take 1 tablet (40 mg) by mouth once daily., Disp: , Rfl:     multivitamin tablet, Take 1 tablet by mouth once daily., Disp: , Rfl:     potassium chloride CR 20 mEq ER tablet, Take 1 tablet (20 mEq) by mouth once daily. Do not crush or chew., Disp: , Rfl:     sacubitriL-valsartan (Entresto) 24-26 mg tablet, Take 0.5 tablets by mouth 2 times a day., Disp: , Rfl:     atorvastatin (Lipitor) 20 mg tablet, Take 1 tablet (20 mg) by mouth once daily at bedtime., Disp: , Rfl:     cholecalciferol (Vitamin D-3) 25 MCG (1000 UT) capsule, Take 1 capsule (25 mcg) by mouth once daily., Disp: , Rfl:      Allergies:  Allergies   Allergen Reactions    Bactrim [Sulfamethoxazole-Trimethoprim] Unknown    Ciprofloxacin Unknown    Dicyclomine Unknown    Meloxicam Unknown    Rosuvastatin Myalgia, Rash and Unknown        Physical Exam:  Last Recorded Vitals  Height 1.778 m (5' 10\"), weight 101 kg (223 lb).  General:     General appearance: Well-developed, well-nourished in no acute distress.       Voice:  normal       Head/face: Normal appearance; nontender to palpation     Facial nerve function: Normal and symmetric bilaterally.    Oral/oropharynx:     Oral " vestibule: Normal labial and gingival mucosa     Tongue/floor of mouth: Normal without lesion     Oropharynx: Clear.  No lesions present of the hard/soft palate, posterior pharynx    Neck:     Neck: Normal appearance, trachea midline     Salivary glands: Normal to palpation bilaterally     Lymph nodes: No cervical lymphadenopathy to palpation     Thyroid: No thyromegaly.  No palpable nodules     Range of motion: Normal    Neurological:     Cortical functions: Alert and oriented x3, appropriate affect       Larynx/hypopharynx:     Laryngeal findings: Mirror exam inadequate or limited secondary to enlarged base of tongue and/or excessive gagging    Ear:     Ear canal: Normal bilaterally after cleaning cerumen impaction bilaterally with wire-loop and microscope     Tympanic membrane: Intact and mobile bilaterally     Pinna: Normal bilaterally     Hearing:  Gross hearing assessment normal by voice    Nose:     Visualized using: Anterior rhinoscopy     Nasopharynx: Inadequate mirror exam secondary to gag, anatomy.       Nasal dorsum: Nontraumatic midline appearance     Septum: Midline     Inferior turbinates: Normally sized     Mucosa: Bilateral, pink, normal appearing       Assessment/Plan   Ears cleaned bilaterally.  He missed a visit and there was a little more wax than usual.  Recommend continue routine maintenance and see him back in 4 to 6 months, sooner as needed         Jarrod Patterson MD

## 2024-08-15 NOTE — TELEPHONE ENCOUNTER
Attempted to reach out to patient to make him aware that the lab was ordered and the requisition was in the computer. Message left on answering machine that the orders were in the computer for blood work and asked that he call the office back with any questions regarding my message

## 2024-08-22 ENCOUNTER — LAB (OUTPATIENT)
Dept: LAB | Facility: LAB | Age: 79
End: 2024-08-22
Payer: MEDICARE

## 2024-08-22 DIAGNOSIS — I50.9 ACUTE ON CHRONIC HEART FAILURE, UNSPECIFIED HEART FAILURE TYPE (MULTI): ICD-10-CM

## 2024-08-23 ENCOUNTER — TELEPHONE (OUTPATIENT)
Dept: CARDIOLOGY | Facility: CLINIC | Age: 79
End: 2024-08-23
Payer: MEDICARE

## 2024-08-23 DIAGNOSIS — I50.9 ACUTE ON CHRONIC HEART FAILURE, UNSPECIFIED HEART FAILURE TYPE (MULTI): Primary | ICD-10-CM

## 2024-08-23 NOTE — PROGRESS NOTES
Lab reached out to office to state BMP was cancelled. Appears specimen was hemolyzed. Order placed for patient to go have drawn again. ZOLTAN Blount will reach out to patient to let him know to go to lab at his convenience for repeat.

## 2024-08-23 NOTE — TELEPHONE ENCOUNTER
Lab called and  stated that the patient labs was cancalled .due to being Hemolyzed. Called patient back to let him know that he needs to go back and have his labs drawn again. Patient not understaing what happened stated he will go back to get done

## 2024-08-26 ENCOUNTER — LAB (OUTPATIENT)
Dept: LAB | Facility: LAB | Age: 79
End: 2024-08-26
Payer: MEDICARE

## 2024-08-26 DIAGNOSIS — I50.9 ACUTE ON CHRONIC HEART FAILURE, UNSPECIFIED HEART FAILURE TYPE (MULTI): ICD-10-CM

## 2024-08-26 PROCEDURE — 36415 COLL VENOUS BLD VENIPUNCTURE: CPT

## 2024-08-26 PROCEDURE — 80048 BASIC METABOLIC PNL TOTAL CA: CPT

## 2024-08-27 LAB
ANION GAP SERPL CALC-SCNC: 14 MMOL/L
BUN SERPL-MCNC: 17 MG/DL (ref 8–25)
CALCIUM SERPL-MCNC: 10.1 MG/DL (ref 8.5–10.4)
CHLORIDE SERPL-SCNC: 101 MMOL/L (ref 97–107)
CO2 SERPL-SCNC: 24 MMOL/L (ref 24–31)
CREAT SERPL-MCNC: 1 MG/DL (ref 0.4–1.6)
EGFRCR SERPLBLD CKD-EPI 2021: 76 ML/MIN/1.73M*2
GLUCOSE SERPL-MCNC: 138 MG/DL (ref 65–99)
POTASSIUM SERPL-SCNC: 5.1 MMOL/L (ref 3.4–5.1)
SODIUM SERPL-SCNC: 139 MMOL/L (ref 133–145)

## 2024-09-12 ENCOUNTER — TELEPHONE (OUTPATIENT)
Dept: CARDIOLOGY | Facility: CLINIC | Age: 79
End: 2024-09-12
Payer: MEDICARE

## 2024-11-07 ENCOUNTER — APPOINTMENT (OUTPATIENT)
Dept: CARDIOLOGY | Facility: CLINIC | Age: 79
End: 2024-11-07
Payer: MEDICARE

## 2024-12-05 ENCOUNTER — APPOINTMENT (OUTPATIENT)
Dept: CARDIOLOGY | Facility: CLINIC | Age: 79
End: 2024-12-05
Payer: MEDICARE

## 2024-12-10 ENCOUNTER — OFFICE VISIT (OUTPATIENT)
Dept: CARDIOLOGY | Facility: CLINIC | Age: 79
End: 2024-12-10
Payer: MEDICARE

## 2024-12-10 VITALS — OXYGEN SATURATION: 96 % | SYSTOLIC BLOOD PRESSURE: 146 MMHG | DIASTOLIC BLOOD PRESSURE: 80 MMHG | HEART RATE: 55 BPM

## 2024-12-10 VITALS — DIASTOLIC BLOOD PRESSURE: 64 MMHG | SYSTOLIC BLOOD PRESSURE: 146 MMHG | WEIGHT: 232 LBS | BODY MASS INDEX: 33.29 KG/M2

## 2024-12-10 DIAGNOSIS — I42.8 NONISCHEMIC CARDIOMYOPATHY (MULTI): Primary | ICD-10-CM

## 2024-12-10 DIAGNOSIS — I50.22 CHRONIC SYSTOLIC HEART FAILURE: ICD-10-CM

## 2024-12-10 DIAGNOSIS — I10 PRIMARY HYPERTENSION: ICD-10-CM

## 2024-12-10 DIAGNOSIS — I50.9 ACUTE ON CHRONIC HEART FAILURE, UNSPECIFIED HEART FAILURE TYPE: ICD-10-CM

## 2024-12-10 DIAGNOSIS — E78.2 MIXED HYPERLIPIDEMIA: ICD-10-CM

## 2024-12-10 DIAGNOSIS — I25.10 ASHD (ARTERIOSCLEROTIC HEART DISEASE): Primary | ICD-10-CM

## 2024-12-10 PROCEDURE — 99213 OFFICE O/P EST LOW 20 MIN: CPT | Performed by: INTERNAL MEDICINE

## 2024-12-10 PROCEDURE — 1036F TOBACCO NON-USER: CPT | Performed by: INTERNAL MEDICINE

## 2024-12-10 PROCEDURE — 99213 OFFICE O/P EST LOW 20 MIN: CPT | Mod: 25 | Performed by: INTERNAL MEDICINE

## 2024-12-10 PROCEDURE — 93010 ELECTROCARDIOGRAM REPORT: CPT | Performed by: INTERNAL MEDICINE

## 2024-12-10 PROCEDURE — 3077F SYST BP >= 140 MM HG: CPT | Performed by: INTERNAL MEDICINE

## 2024-12-10 PROCEDURE — 1126F AMNT PAIN NOTED NONE PRSNT: CPT | Performed by: INTERNAL MEDICINE

## 2024-12-10 PROCEDURE — 99214 OFFICE O/P EST MOD 30 MIN: CPT | Performed by: INTERNAL MEDICINE

## 2024-12-10 PROCEDURE — 3078F DIAST BP <80 MM HG: CPT | Performed by: INTERNAL MEDICINE

## 2024-12-10 PROCEDURE — 3079F DIAST BP 80-89 MM HG: CPT | Performed by: INTERNAL MEDICINE

## 2024-12-10 PROCEDURE — 1159F MED LIST DOCD IN RCRD: CPT | Performed by: INTERNAL MEDICINE

## 2024-12-10 ASSESSMENT — ENCOUNTER SYMPTOMS
SHORTNESS OF BREATH: 1
LOSS OF SENSATION IN FEET: 0
OCCASIONAL FEELINGS OF UNSTEADINESS: 0
DEPRESSION: 0

## 2024-12-10 ASSESSMENT — PATIENT HEALTH QUESTIONNAIRE - PHQ9
2. FEELING DOWN, DEPRESSED OR HOPELESS: NOT AT ALL
1. LITTLE INTEREST OR PLEASURE IN DOING THINGS: NOT AT ALL
SUM OF ALL RESPONSES TO PHQ9 QUESTIONS 1 AND 2: 0

## 2024-12-10 ASSESSMENT — PAIN SCALES - GENERAL
PAINLEVEL_OUTOF10: 0-NO PAIN
PAINLEVEL_OUTOF10: 0-NO PAIN

## 2024-12-10 NOTE — ASSESSMENT & PLAN NOTE
Patient carries a history of nonobstructive coronary artery disease.  Although one of the obstructions was reportedly a 40% ostial left main trunk stenosis.  Looks like the last catheterization approximately 2020.  Will continue standard risk factor modification and follow on a clinical basis.

## 2024-12-10 NOTE — ASSESSMENT & PLAN NOTE
Left ventricular ejection fraction 30 to 35% in April.  Dr. Escobar requested an additional echocardiogram to reassess left ventricular systolic function.  I will place order.  I will also increase the patient's Entresto to 1 tablet twice daily instead of 1/2 tablet twice daily.  I answered multiple questions for over 30 minutes in regards to reduced left ventricular function with the patient.

## 2024-12-10 NOTE — PROGRESS NOTES
Chief Complaint   Patient presents with    Follow-up    Shortness of Breath            The patient is an 79-year-old male with a history of a nonischemic cardiomyopathy seeing me today in follow-up.  He has had difficulty with compliance with his medications given hypotension.  He continues to have dyspnea with exertion although he believes it is somewhat improved.  He has not had a reassessment of his ejection fraction but continues to be quite anxious regarding potential device therapy in the future.    Shortness of Breath       Active Ambulatory Problems     Diagnosis Date Noted    Acute on chronic heart failure, unspecified heart failure type 04/15/2024    Elevated troponin 04/15/2024    Chest pain 04/15/2024     Resolved Ambulatory Problems     Diagnosis Date Noted    No Resolved Ambulatory Problems     Past Medical History:   Diagnosis Date    Kidney stones     Personal history of malignant neoplasm, unspecified     Personal history of other diseases of the circulatory system     Personal history of other diseases of urinary system     Prostate cancer (Multi)         Review of Systems   Respiratory:  Positive for shortness of breath.    All other systems reviewed and are negative.       Objective     There were no vitals filed for this visit.     Physical Exam       Lab Review:   No visits with results within 2 Month(s) from this visit.   Latest known visit with results is:   Lab on 08/26/2024   Component Date Value    Glucose 08/26/2024 138 (H)     Sodium 08/26/2024 139     Potassium 08/26/2024 5.1     Chloride 08/26/2024 101     Bicarbonate 08/26/2024 24     Urea Nitrogen 08/26/2024 17     Creatinine 08/26/2024 1.00     eGFR 08/26/2024 76     Calcium 08/26/2024 10.1     Anion Gap 08/26/2024 14        ECG:  Normal sinus rhythm at 79 bpm with first-degree AV block QRS duration 160 ms with right bundle branch block and occasional PVCs.    Assessment/plan:  Patient is reluctant to pursue any therapy at this  time.  He follows at the [facility] and will continue pharmacological therapy for now.  Problem List Items Addressed This Visit       Acute on chronic heart failure, unspecified heart failure type     Other Visit Diagnoses       Nonischemic cardiomyopathy (Multi)    -  Primary    Relevant Orders    ECG 12 lead (Clinic Performed)

## 2024-12-10 NOTE — ASSESSMENT & PLAN NOTE
BiPAP pressure mildly elevated but he states his home readings are all on the low side.  Will asked the patient to bring in his home blood pressure monitor on return and continue to keep a log.

## 2024-12-10 NOTE — ASSESSMENT & PLAN NOTE
[facility] states that [facility] and that they have been relatively well-controlled.

## 2024-12-10 NOTE — PROGRESS NOTES
Referred by Dr. Ramsay ref. provider found for Establish Care     History Of Present Illness:    Eh Saenz is a 79 y.o. male presenting with Need to establish new cardiology care.  The patient carries a diagnosis of a nonischemic cardiomyopathy.  It appears he had a heart catheterization done in  revealing a 40% ostial left main stenosis at 20% right coronary artery stenosis and a reduced left ventricular ejection fraction.  He is followed with Dr. Ferreira since that time.  He states he had a stress test done him in his office last year and states that it was okay.  Currently experiences no chest pain or anginal symptoms.  He does have shortness of breath if he exerts more heavily.  He saw Dr. Escobar today and he wanted him to have another echocardiogram to reassess left ventricular systolic function.      Past Medical History:  He has a past medical history of Kidney stones, Personal history of malignant neoplasm, unspecified, Personal history of other diseases of the circulatory system, Personal history of other diseases of urinary system, and Prostate cancer (Multi).  Nonischemic cardiomyopathy  Nonobstructive coronary artery disease    Past Surgical History:  He has a past surgical history that includes Other surgical history (10/18/2021); Other surgical history (10/18/2021); and Cataract extraction.  Prostate cancer surgery  Back surgery    Social History:  He reports that he has never smoked. He has never been exposed to tobacco smoke. He has never used smokeless tobacco. He reports current alcohol use of about 2.0 standard drinks of alcohol per week. He reports that he does not use drugs.    Retired    Family History:  Mother  at age 75 from complications of diabetes.  Father  at age 78 from complications of COPD.     Allergies:  Bactrim [sulfamethoxazole-trimethoprim], Ciprofloxacin, Dicyclomine, Meloxicam, and Rosuvastatin    Outpatient Medications:  Current Outpatient  Medications   Medication Instructions    atorvastatin (LIPITOR) 20 mg, Nightly    carvedilol (COREG) 3.125 mg, oral, 2 times daily (morning and late afternoon)    cholecalciferol (VITAMIN D-3) 25 mcg, Daily    furosemide (LASIX) 40 mg, Daily    multivitamin tablet 1 tablet, Daily    potassium chloride CR 20 mEq ER tablet 20 mEq, Daily    sacubitriL-valsartan (Entresto) 24-26 mg tablet 0.5 tablets, 2 times daily        Last Recorded Vitals:  Vitals:    12/10/24 1559   BP: 146/80   Pulse: 55   SpO2: 96%       Physical Exam:  Constitutional:       Appearance: Not in distress.   Eyes:      Conjunctiva/sclera: Conjunctivae normal.   HENT:    Mouth/Throat:      Pharynx: Oropharynx is clear.   Neck:      Vascular: No carotid bruit. JVD normal.   Pulmonary:      Breath sounds: Normal breath sounds. No wheezing. No rales.   Cardiovascular:      Regular rhythm.      Murmurs: There is no murmur.      No gallop.  No click. No rub.   Abdominal:      Palpations: Abdomen is soft.      Tenderness: There is no abdominal tenderness.   Musculoskeletal:         General: No deformity. Neurological:      General: No focal deficit present.             Last Labs:  CBC -  Lab Results   Component Value Date    WBC 8.3 04/18/2024    HGB 13.2 (L) 04/18/2024    HCT 41.6 04/18/2024    MCV 91 04/18/2024     04/18/2024       CMP -  Lab Results   Component Value Date    CALCIUM 10.1 08/26/2024    PROT 6.1 04/16/2024    ALBUMIN 3.6 04/16/2024    AST 16 04/16/2024    ALT 15 04/16/2024    ALKPHOS 62 04/16/2024    BILITOT 1.9 (H) 04/16/2024       LIPID PANEL -   Lab Results   Component Value Date    CHOL 157 08/28/2022    TRIG 97 08/28/2022    HDL 33 (L) 08/28/2022    CHHDL 4.8 08/28/2022       RENAL FUNCTION PANEL -   Lab Results   Component Value Date    GLUCOSE 138 (H) 08/26/2024     08/26/2024    K 5.1 08/26/2024     08/26/2024    CO2 24 08/26/2024    ANIONGAP 14 08/26/2024    BUN 17 08/26/2024    CREATININE 1.00 08/26/2024     "CALCIUM 10.1 08/26/2024    ALBUMIN 3.6 04/16/2024        Lab Results   Component Value Date    HGBA1C 6.0 08/12/2020       Last Cardiology Tests:  ECG:  ECG 12 lead (Clinic Performed) 08/05/2024      Echo:  Transthoracic Echo (TTE) Complete 04/16/2024      Ejection Fractions:  No results found for: \"EF\"    Cath:  No results found for this or any previous visit from the past 1095 days.      Stress Test:  No results found for this or any previous visit from the past 1095 days.      Cardiac Imaging:  No results found for this or any previous visit from the past 1095 days.            Assessment/Plan     ASHD (arteriosclerotic heart disease)  Patient carries a history of nonobstructive coronary artery disease.  Although one of the obstructions was reportedly a 40% ostial left main trunk stenosis.  Looks like the last catheterization approximately 2020.  Will continue standard risk factor modification and follow on a clinical basis.    Chronic systolic heart failure  Left ventricular ejection fraction 30 to 35% in April.  Dr. Escobar requested an additional echocardiogram to reassess left ventricular systolic function.  I will place order.  I will also increase the patient's Entresto to 1 tablet twice daily instead of 1/2 tablet twice daily.  I answered multiple questions for over 30 minutes in regards to reduced left ventricular function with the patient.    Mixed hyperlipidemia  [facility] and that they have been relatively well-controlled.    Primary hypertension  BiPAP pressure mildly elevated but he states his home readings are all on the low side.  Will asked the patient to bring in his home blood pressure monitor on return and continue to keep a log.       Daryl Kamara, DO  "

## 2024-12-18 ENCOUNTER — APPOINTMENT (OUTPATIENT)
Dept: OTOLARYNGOLOGY | Facility: CLINIC | Age: 79
End: 2024-12-18
Payer: MEDICARE

## 2024-12-18 ENCOUNTER — HOSPITAL ENCOUNTER (OUTPATIENT)
Dept: CARDIOLOGY | Facility: HOSPITAL | Age: 79
Discharge: HOME | End: 2024-12-18
Payer: MEDICARE

## 2024-12-18 VITALS — HEIGHT: 70 IN | WEIGHT: 232 LBS | BODY MASS INDEX: 33.21 KG/M2

## 2024-12-18 DIAGNOSIS — I50.22 CHRONIC SYSTOLIC HEART FAILURE: ICD-10-CM

## 2024-12-18 DIAGNOSIS — H61.23 BILATERAL IMPACTED CERUMEN: Primary | ICD-10-CM

## 2024-12-18 DIAGNOSIS — H90.3 BILATERAL SENSORINEURAL HEARING LOSS: ICD-10-CM

## 2024-12-18 LAB
AORTIC VALVE MEAN GRADIENT: 3 MMHG
AORTIC VALVE PEAK VELOCITY: 1.06 M/S
AV PEAK GRADIENT: 4 MMHG
AVA (PEAK VEL): 2.95 CM2
AVA (VTI): 2.89 CM2
EJECTION FRACTION APICAL 4 CHAMBER: 41.9
EJECTION FRACTION: 38 %
LEFT ATRIUM VOLUME AREA LENGTH INDEX BSA: 47.4 ML/M2
LEFT VENTRICLE INTERNAL DIMENSION DIASTOLE: 6.41 CM (ref 3.5–6)
LEFT VENTRICULAR OUTFLOW TRACT DIAMETER: 2.2 CM
LV EJECTION FRACTION BIPLANE: 37 %
MITRAL VALVE E/A RATIO: 0.84
RIGHT VENTRICLE FREE WALL PEAK S': 8.33 CM/S
RIGHT VENTRICLE PEAK SYSTOLIC PRESSURE: 20 MMHG
TRICUSPID ANNULAR PLANE SYSTOLIC EXCURSION: 2.4 CM

## 2024-12-18 PROCEDURE — 93306 TTE W/DOPPLER COMPLETE: CPT

## 2024-12-18 PROCEDURE — 93306 TTE W/DOPPLER COMPLETE: CPT | Performed by: INTERNAL MEDICINE

## 2024-12-18 PROCEDURE — 1159F MED LIST DOCD IN RCRD: CPT | Performed by: OTOLARYNGOLOGY

## 2024-12-18 PROCEDURE — 1036F TOBACCO NON-USER: CPT | Performed by: OTOLARYNGOLOGY

## 2024-12-18 PROCEDURE — 1160F RVW MEDS BY RX/DR IN RCRD: CPT | Performed by: OTOLARYNGOLOGY

## 2024-12-18 PROCEDURE — 99213 OFFICE O/P EST LOW 20 MIN: CPT | Performed by: OTOLARYNGOLOGY

## 2024-12-18 NOTE — PROGRESS NOTES
"HPI  Eh Saenz is a 79 y.o. male here for routine cerumen management.  He has baseline sensorineural hearing loss but does not want expensive hearing aids.  Denies otalgia and otorrhea.  Generally has been doing well.  Dealing with other medical issues including congestive heart failure and recovering from recent skin tag removal of left eyelid      Past Medical History:   Diagnosis Date    Kidney stones     Personal history of malignant neoplasm, unspecified     History of malignant neoplasm    Personal history of other diseases of the circulatory system     History of hypertension    Personal history of other diseases of urinary system     History of kidney disease    Prostate cancer (Multi)             Medications:     Current Outpatient Medications:     carvedilol (Coreg) 3.125 mg tablet, Take 1 tablet (3.125 mg) by mouth 2 times daily (morning and late afternoon)., Disp: 60 tablet, Rfl: 11    cholecalciferol (Vitamin D-3) 25 MCG (1000 UT) capsule, Take 1 capsule (25 mcg) by mouth once daily., Disp: , Rfl:     furosemide (Lasix) 40 mg tablet, Take 1 tablet (40 mg) by mouth once daily., Disp: , Rfl:     multivitamin tablet, Take 1 tablet by mouth once daily., Disp: , Rfl:     potassium chloride CR 20 mEq ER tablet, Take 1 tablet (20 mEq) by mouth once daily. Do not crush or chew., Disp: , Rfl:     sacubitriL-valsartan (Entresto) 24-26 mg tablet, Take 0.5 tablets by mouth 2 times a day., Disp: , Rfl:     atorvastatin (Lipitor) 20 mg tablet, Take 1 tablet (20 mg) by mouth once daily at bedtime. (Patient not taking: Reported on 12/18/2024), Disp: , Rfl:      Allergies:  Allergies   Allergen Reactions    Bactrim [Sulfamethoxazole-Trimethoprim] Unknown    Ciprofloxacin Unknown    Dicyclomine Unknown    Meloxicam Unknown    Rosuvastatin Myalgia, Rash and Unknown        Physical Exam:  Last Recorded Vitals  Height 1.778 m (5' 10\"), weight 105 kg (232 lb).  General:     General appearance: Well-developed, " well-nourished in no acute distress.       Voice:  normal       Head/face: Normal appearance; nontender to palpation.  He does have healing area left eyelid from recent excision of skin tag     Facial nerve function: Normal and symmetric bilaterally.    Oral/oropharynx:     Oral vestibule: Normal labial and gingival mucosa     Tongue/floor of mouth: Normal without lesion     Oropharynx: Clear.  No lesions present of the hard/soft palate, posterior pharynx    Neck:     Neck: Normal appearance, trachea midline     Salivary glands: Normal to palpation bilaterally     Lymph nodes: No cervical lymphadenopathy to palpation     Thyroid: No thyromegaly.  No palpable nodules     Range of motion: Normal    Neurological:     Cortical functions: Alert and oriented x3, appropriate affect       Larynx/hypopharynx:     Laryngeal findings: Mirror exam inadequate or limited secondary to enlarged base of tongue and/or excessive gagging    Ear:     Ear canal: Normal bilaterally after cleaning cerumen impaction bilaterally with suction and microscope     Tympanic membrane: Intact and mobile bilaterally     Pinna: Normal bilaterally     Hearing:  Gross hearing assessment normal by voice    Nose:     Visualized using: Anterior rhinoscopy     Nasopharynx: Inadequate mirror exam secondary to gag, anatomy.       Nasal dorsum: Nontraumatic midline appearance     Septum: Midline     Inferior turbinates: Normally sized     Mucosa: Bilateral, pink, normal appearing       Assessment/Plan   Ears cleaned bilaterally.  Continue mineral oil prophylaxis and routine maintenance.  Recheck 4 months, sooner as needed       Jarrod Patterson MD

## 2024-12-20 NOTE — ASSESSMENT & PLAN NOTE
Patient is reluctant to pursue any therapy at this time.  He follows at the [facility] and will continue pharmacological therapy for now.

## 2024-12-26 ENCOUNTER — APPOINTMENT (OUTPATIENT)
Dept: CARDIOLOGY | Facility: CLINIC | Age: 79
End: 2024-12-26
Payer: MEDICARE

## 2025-01-16 ENCOUNTER — TELEPHONE (OUTPATIENT)
Dept: CARDIOLOGY | Facility: CLINIC | Age: 80
End: 2025-01-16

## 2025-01-16 NOTE — TELEPHONE ENCOUNTER
Left ventricular ejection fraction is increased to 35 to 40% on the echocardiogram.  Reviewed this with the patient and reinforced the need to take the Entresto and the carvedilol as an dose of the agents that likely have resulted in the improvement.  Discussed this with the patient in detail.

## 2025-02-15 NOTE — PROGRESS NOTES
Patient is a 79 y.o. male presenting with PMH of prostate cancer, kidney stones, urinary frequency and urinary incontinence.    SUBJECTIVE:  HPI   He presents as a new patient.  He has history of prostate cancer treated with brachytherapy in 10/2019.  He takes furosemide and experiences urinary frequency after taking the medication in the evening.  He wears depends for incontinence.  He has history of kidney stones and denies any past history of surgical intervention.  He had erectile dysfunction; however, he is not in a relationship and is uninterested in treatment at this time.    He has history of spinal surgery.    Past Medical History:   Diagnosis Date    Kidney stones     Personal history of malignant neoplasm, unspecified     History of malignant neoplasm    Personal history of other diseases of the circulatory system     History of hypertension    Personal history of other diseases of urinary system     History of kidney disease    Prostate cancer (Multi)      Past Surgical History:   Procedure Laterality Date    CATARACT EXTRACTION      OTHER SURGICAL HISTORY  10/18/2021    Kidney surgery    OTHER SURGICAL HISTORY  10/18/2021    Back surgery      No family history on file.   Tobacco Use: Low Risk  (12/18/2024)    Patient History     Smoking Tobacco Use: Never     Smokeless Tobacco Use: Never     Passive Exposure: Never       Review of Systems   Constitutional: denies any unintentional weight loss or change in strength.  Integumentary: denies any rashes or pruritus.  Eyes: denies any double vision or eye pain.  Ear/Nose/Mouth/Throat: denies any nosebleeds or gum bleeds.  Cardiovascular: denies any chest pain or syncope.  Respiratory: denies hemoptysis.  Gastrointestinal: denies nausea or vomiting.  Musculoskeletal: denies muscle cramping or weakness.  Neurologic: denies convulsions or seizures.  Hematologic/Lymphatic: denies bleeding tendencies.  Endocrine: denies heat/cold intolerance.  All other systems  "have been reviewed and are negative unless otherwise noted in the HPI.    OBJECTIVE:  There were no vitals taken for this visit.  Physical Exam   Constitutional: No obvious distress.  Eyes: Non-injected conjunctiva, sclera clear, EOMI.  Ears/Nose/Mouth/Throat: No obvious drainage per ears or nose.  Cardiovascular: Extremities are warm and well perfused. No edema, cyanosis or pallor.  Respiratory: No audible wheezing/stridor; respirations do not appear labored.  Gastrointestinal: Abdomen soft, not distended.  Musculoskeletal: Normal ROM of extremities.  Skin: No obvious rashes or open sores.  Neurologic: Alert and oriented, CN 2-12 grossly intact.  Psychiatric: Answers questions appropriately with normal affect.  Hematologic/Lymphatic/Immunologic: No obvious bruises or sites of spontaneous bleeding.  Genitourinary: No CVA tenderness, bladder not palpable. No testicular masses or tenderness. PENNY: prostate is flat. No nodules or tenderness.    LABS:  Lab Results   Component Value Date    GLUCOSE 138 (H) 08/26/2024    CALCIUM 10.1 08/26/2024     08/26/2024    K 5.1 08/26/2024    CO2 24 08/26/2024     08/26/2024    BUN 17 08/26/2024    CREATININE 1.00 08/26/2024     PSA  10/03/22  0.32  04/04/22  0.50  12/01/21  0.40  No results found for: \"URICACID\"  No results found for: \"PTH\"  No results found for: \"TESTOSTERONE\"  Urine Culture (no units)   Date Value   04/15/2024 No significant growth      IMAGING:      PROCEDURES:  PVR 0 mL  2/14/25    ASSESSMENT/PLAN:  Problem List Items Addressed This Visit    None  Visit Diagnoses       History of prostate cancer    -  Primary    Urinary symptom or sign        Relevant Orders    Measure post void residual (Completed)    POCT UA Automated manually resulted (Completed)    Kidney stones               He has history of prostate cancer treated with brachytherapy in 2019. His last PSA was 0.36 in 1/25 (reported by [facility] His PSA was 0.32 in 10/22. He will " follow up in 6 months with PSA.     He has history of kidney stones. No past surgical procedures. He had a KUB in 7/23 that identified bilateral renal stones, up to 9 mm on the left kidney. He will have KUB today.  If necessary, we discussed treatment of his stones with ESWL vs ureteroscopy. He understands that he would need to hold NSAID's for 7 days and anticoagulants for 3 days prior to the procedure.    He has urinary urgency with incontinence and uses depends. He takes daily furosemide. His PVR is 0 mL today (2/17/25).    UA is negative today (2/17/25).    All questions were answered to the patient’s satisfaction.  Patient agrees with the plan and wishes to proceed.  Follow-up will be scheduled appropriately.     Scribe Attestation  By signing my name below, I, Billy Gagnon,   attest that this documentation has been prepared under the direction and in the presence of Saul Vo MD.

## 2025-02-17 ENCOUNTER — HOSPITAL ENCOUNTER (OUTPATIENT)
Dept: RADIOLOGY | Facility: HOSPITAL | Age: 80
Discharge: HOME | End: 2025-02-17
Payer: MEDICARE

## 2025-02-17 ENCOUNTER — APPOINTMENT (OUTPATIENT)
Dept: UROLOGY | Facility: CLINIC | Age: 80
End: 2025-02-17
Payer: MEDICARE

## 2025-02-17 DIAGNOSIS — N20.0 KIDNEY STONES: ICD-10-CM

## 2025-02-17 DIAGNOSIS — R39.9 URINARY SYMPTOM OR SIGN: ICD-10-CM

## 2025-02-17 DIAGNOSIS — Z85.46 HISTORY OF PROSTATE CANCER: Primary | ICD-10-CM

## 2025-02-17 LAB
POC APPEARANCE, URINE: CLEAR
POC BILIRUBIN, URINE: NEGATIVE
POC BLOOD, URINE: NEGATIVE
POC COLOR, URINE: YELLOW
POC GLUCOSE, URINE: NEGATIVE MG/DL
POC KETONES, URINE: ABNORMAL MG/DL
POC LEUKOCYTES, URINE: NEGATIVE
POC NITRITE,URINE: NEGATIVE
POC PH, URINE: 5.5 PH
POC PROTEIN, URINE: NEGATIVE MG/DL
POC SPECIFIC GRAVITY, URINE: 1.02
POC UROBILINOGEN, URINE: 0.2 EU/DL

## 2025-02-17 PROCEDURE — 81003 URINALYSIS AUTO W/O SCOPE: CPT | Performed by: UROLOGY

## 2025-02-17 PROCEDURE — 74018 RADEX ABDOMEN 1 VIEW: CPT

## 2025-02-17 PROCEDURE — 74018 RADEX ABDOMEN 1 VIEW: CPT | Performed by: RADIOLOGY

## 2025-02-17 PROCEDURE — G2211 COMPLEX E/M VISIT ADD ON: HCPCS | Performed by: UROLOGY

## 2025-02-17 PROCEDURE — 99204 OFFICE O/P NEW MOD 45 MIN: CPT | Performed by: UROLOGY

## 2025-02-17 PROCEDURE — 1159F MED LIST DOCD IN RCRD: CPT | Performed by: UROLOGY

## 2025-04-11 ENCOUNTER — APPOINTMENT (OUTPATIENT)
Age: 80
End: 2025-04-11
Payer: MEDICARE

## 2025-04-14 ENCOUNTER — APPOINTMENT (OUTPATIENT)
Dept: OTOLARYNGOLOGY | Facility: CLINIC | Age: 80
End: 2025-04-14
Payer: MEDICARE

## 2025-04-14 VITALS — BODY MASS INDEX: 33.64 KG/M2 | TEMPERATURE: 98.1 F | WEIGHT: 235 LBS | HEIGHT: 70 IN

## 2025-04-14 DIAGNOSIS — H90.3 BILATERAL SENSORINEURAL HEARING LOSS: ICD-10-CM

## 2025-04-14 DIAGNOSIS — H61.23 BILATERAL IMPACTED CERUMEN: Primary | ICD-10-CM

## 2025-04-14 PROCEDURE — 99213 OFFICE O/P EST LOW 20 MIN: CPT | Performed by: OTOLARYNGOLOGY

## 2025-04-14 PROCEDURE — 1159F MED LIST DOCD IN RCRD: CPT | Performed by: OTOLARYNGOLOGY

## 2025-04-14 PROCEDURE — 1036F TOBACCO NON-USER: CPT | Performed by: OTOLARYNGOLOGY

## 2025-04-14 PROCEDURE — 1160F RVW MEDS BY RX/DR IN RCRD: CPT | Performed by: OTOLARYNGOLOGY

## 2025-04-15 NOTE — PROGRESS NOTES
"HPI  Eh Saenz is a 79 y.o. male here for routine cerumen management.  He has baseline sensorineural hearing loss.  Denies otalgia and otorrhea.  Generally has been doing well.  Dealing with other medical issues including congestive heart failure.      Past Medical History:   Diagnosis Date    Kidney stones     Personal history of malignant neoplasm, unspecified     History of malignant neoplasm    Personal history of other diseases of the circulatory system     History of hypertension    Personal history of other diseases of urinary system     History of kidney disease    Prostate cancer (Multi)             Medications:     Current Outpatient Medications:     carvedilol (Coreg) 3.125 mg tablet, Take 1 tablet (3.125 mg) by mouth 2 times daily (morning and late afternoon)., Disp: 60 tablet, Rfl: 11    cholecalciferol (Vitamin D-3) 25 MCG (1000 UT) capsule, Take 1 capsule (25 mcg) by mouth once daily., Disp: , Rfl:     furosemide (Lasix) 40 mg tablet, Take 1 tablet (40 mg) by mouth once daily., Disp: , Rfl:     multivitamin tablet, Take 1 tablet by mouth once daily., Disp: , Rfl:     potassium chloride CR 20 mEq ER tablet, Take 1 tablet (20 mEq) by mouth once daily. Do not crush or chew., Disp: , Rfl:     sacubitriL-valsartan (Entresto) 24-26 mg tablet, Take 0.5 tablets by mouth 2 times a day., Disp: , Rfl:     atorvastatin (Lipitor) 20 mg tablet, Take 1 tablet (20 mg) by mouth once daily at bedtime. (Patient not taking: Reported on 4/14/2025), Disp: , Rfl:      Allergies:  Allergies   Allergen Reactions    Ciprofloxacin Unknown, Agitation, GI Upset and Rash    Rosuvastatin Myalgia, Rash, Unknown and Agitation    Meloxicam Unknown, Dizziness, Drowsiness, Other and Tinnitus     hallucinations    Sulfamethoxazole-Trimethoprim Unknown, Hallucinations, Other and Rash     Pt states \"it made me see things that weren't there\"    Metoprolol Other    Dicyclomine Unknown, Dizziness, Hives and Rash        Physical " "Exam:  Last Recorded Vitals  Temperature 36.7 °C (98.1 °F), height 1.778 m (5' 10\"), weight 107 kg (235 lb).  General:     General appearance: Well-developed, well-nourished in no acute distress.       Voice:  normal       Head/face: Normal appearance; nontender to palpation.      Facial nerve function: Normal and symmetric bilaterally.    Oral/oropharynx:     Oral vestibule: Normal labial and gingival mucosa     Tongue/floor of mouth: Normal without lesion     Oropharynx: Clear.  No lesions present of the hard/soft palate, posterior pharynx    Neck:     Neck: Normal appearance, trachea midline     Salivary glands: Normal to palpation bilaterally     Lymph nodes: No cervical lymphadenopathy to palpation     Thyroid: No thyromegaly.  No palpable nodules     Range of motion: Normal    Neurological:     Cortical functions: Alert and oriented x3, appropriate affect       Larynx/hypopharynx:     Laryngeal findings: Mirror exam inadequate or limited secondary to enlarged base of tongue and/or excessive gagging    Ear:     Ear canal: Normal bilaterally after cleaning cerumen impaction bilaterally with wire-loop and microscope     Tympanic membrane: Intact and mobile bilaterally     Pinna: Normal bilaterally     Hearing:  Gross hearing assessment normal by voice    Nose:     Visualized using: Anterior rhinoscopy     Nasopharynx: Inadequate mirror exam secondary to gag, anatomy.       Nasal dorsum: Nontraumatic midline appearance     Septum: Midline     Inferior turbinates: Normally sized     Mucosa: Bilateral, pink, normal appearing       Assessment/Plan   Ears cleaned bilaterally.  Continue mineral oil prophylaxis and routine maintenance.  Recheck 4 months, sooner as needed       Jarrod Patterson MD    "

## 2025-06-18 NOTE — ASSESSMENT & PLAN NOTE
I do not see a recent lipid panel on epic laboratory studies.  I will provide the patient with an order for a fasting lipid panel and review on return visit.  The patient stopped taking his atorvastatin concerned with bad things he read online.  Will obtain the lipid panel and review results with the patient on return.  With the 40% stenosis of his left main trunk I believe the cholesterol medication would be a reasonable agent.  Long discussion with the patient answering multiple questions.

## 2025-06-18 NOTE — ASSESSMENT & PLAN NOTE
On last visit I had patient increase the Entresto to 1 tablet twice daily.  Believed to have a nonischemic cardiomyopathy.  Left ventricular systolic function did improve with basic therapy.  Patient states his blood pressures are low frequently and he does not always take his Entresto or carvedilol.  Will take it as frequently as possible.  Use furosemide as needed for congestion and swelling.  Will plan on repeating echocardiogram after next visit.

## 2025-06-23 ENCOUNTER — OFFICE VISIT (OUTPATIENT)
Facility: CLINIC | Age: 80
End: 2025-06-23
Payer: MEDICARE

## 2025-06-23 VITALS
OXYGEN SATURATION: 95 % | DIASTOLIC BLOOD PRESSURE: 76 MMHG | BODY MASS INDEX: 33.65 KG/M2 | HEART RATE: 94 BPM | WEIGHT: 234.5 LBS | SYSTOLIC BLOOD PRESSURE: 130 MMHG

## 2025-06-23 DIAGNOSIS — I10 PRIMARY HYPERTENSION: ICD-10-CM

## 2025-06-23 DIAGNOSIS — I50.22 CHRONIC SYSTOLIC HEART FAILURE: ICD-10-CM

## 2025-06-23 DIAGNOSIS — E78.2 MIXED HYPERLIPIDEMIA: ICD-10-CM

## 2025-06-23 DIAGNOSIS — C61 MALIGNANT NEOPLASM OF PROSTATE (MULTI): ICD-10-CM

## 2025-06-23 DIAGNOSIS — I25.10 ASHD (ARTERIOSCLEROTIC HEART DISEASE): Primary | ICD-10-CM

## 2025-06-23 PROCEDURE — 99212 OFFICE O/P EST SF 10 MIN: CPT | Performed by: INTERNAL MEDICINE

## 2025-06-23 PROCEDURE — 99214 OFFICE O/P EST MOD 30 MIN: CPT | Performed by: INTERNAL MEDICINE

## 2025-06-23 PROCEDURE — G2211 COMPLEX E/M VISIT ADD ON: HCPCS | Performed by: INTERNAL MEDICINE

## 2025-06-23 PROCEDURE — 99212 OFFICE O/P EST SF 10 MIN: CPT

## 2025-06-23 PROCEDURE — 3078F DIAST BP <80 MM HG: CPT | Performed by: INTERNAL MEDICINE

## 2025-06-23 PROCEDURE — 3075F SYST BP GE 130 - 139MM HG: CPT | Performed by: INTERNAL MEDICINE

## 2025-06-23 PROCEDURE — 1036F TOBACCO NON-USER: CPT | Performed by: INTERNAL MEDICINE

## 2025-06-23 PROCEDURE — 1125F AMNT PAIN NOTED PAIN PRSNT: CPT | Performed by: INTERNAL MEDICINE

## 2025-06-23 ASSESSMENT — ENCOUNTER SYMPTOMS
NUMBNESS: 0
SHORTNESS OF BREATH: 0
DEPRESSION: 0
PARESTHESIAS: 0
PALPITATIONS: 0
CONSTIPATION: 1
COUGH: 0
LOSS OF SENSATION IN FEET: 0
DYSPNEA ON EXERTION: 0
ABDOMINAL PAIN: 0
HEMATURIA: 0
BLURRED VISION: 0
DYSURIA: 0
OCCASIONAL FEELINGS OF UNSTEADINESS: 1

## 2025-06-23 ASSESSMENT — LIFESTYLE VARIABLES: TOTAL SCORE: 0

## 2025-06-23 ASSESSMENT — PAIN SCALES - GENERAL: PAINLEVEL_OUTOF10: 5

## 2025-06-23 NOTE — PROGRESS NOTES
Subjective   Eh Saenz is a 79 y.o. male.    Chief Complaint:  6 month f/u    HPI  79-year-old male with a history of a nonischemic cardiomyopathy as well as nonobstructive coronary artery disease reports for cardiology follow-up visit.  Previous patient of Dr. Mckee and currently doing well with no anginal type chest pains.  Does have osteoarthritic joint pains and frequent urination secondary to previous prostate issues.    Review of Systems   Constitutional: Negative for malaise/fatigue.   HENT:  Negative for congestion.    Eyes:  Negative for blurred vision.   Cardiovascular:  Negative for chest pain, dyspnea on exertion and palpitations.   Respiratory:  Negative for cough and shortness of breath.    Musculoskeletal:  Negative for joint pain.   Gastrointestinal:  Positive for constipation. Negative for abdominal pain.   Genitourinary:  Positive for bladder incontinence. Negative for dysuria and hematuria.   Neurological:  Negative for numbness and paresthesias.       Objective   Constitutional:       Appearance: Not in distress.   Eyes:      Conjunctiva/sclera: Conjunctivae normal.   Neck:      Vascular: JVD normal.   Pulmonary:      Breath sounds: Normal breath sounds. No wheezing. No rhonchi. No rales.   Cardiovascular:      Normal rate. Regular rhythm.      Murmurs: There is no murmur.      No gallop.  No click. No rub.   Edema:     Pretibial: trace edema of the left pretibial area and 1+ edema of the right pretibial area.     Ankle: trace edema of the left ankle and 1+ edema of the right ankle.     Feet: trace edema of the left foot and 1+ edema of the right foot.  Abdominal:      Palpations: Abdomen is soft.   Neurological:      General: No focal deficit present.      Mental Status: Alert.         Lab Review:       Assessment/Plan   The primary encounter diagnosis was ASHD (arteriosclerotic heart disease). Diagnoses of Chronic systolic heart failure, Mixed hyperlipidemia, and Primary hypertension  were also pertinent to this visit.    Mixed hyperlipidemia  I do not see a recent lipid panel on epic laboratory studies.  I will provide the patient with an order for a fasting lipid panel and review on return visit.  The patient stopped taking his atorvastatin concerned with bad things he read online.  Will obtain the lipid panel and review results with the patient on return.  With the 40% stenosis of his left main trunk I believe the cholesterol medication would be a reasonable agent.  Long discussion with the patient answering multiple questions.    Chronic systolic heart failure  On last visit I had patient increase the Entresto to 1 tablet twice daily.  Believed to have a nonischemic cardiomyopathy.  Left ventricular systolic function did improve with basic therapy.  Patient states his blood pressures are low frequently and he does not always take his Entresto or carvedilol.  Will take it as frequently as possible.  Use furosemide as needed for congestion and swelling.  Will plan on repeating echocardiogram after next visit.    Primary hypertension  Blood pressure is reasonable.  I reviewed home blood pressure log and readings well controlled.  Continue to follow on a regular basis.  Continue the carvedilol and Entresto medications.    ASHD (arteriosclerotic heart disease)  40% ostial left main trunk disease on catheterization in 2020 with Dr. Mckee.  Continue standard risk factor modification and follow on a clinical basis.

## 2025-06-23 NOTE — ASSESSMENT & PLAN NOTE
40% ostial left main trunk disease on catheterization in 2020 with Dr. Mckee.  Continue standard risk factor modification and follow on a clinical basis.

## 2025-06-23 NOTE — ASSESSMENT & PLAN NOTE
Blood pressure is reasonable.  I reviewed home blood pressure log and readings well controlled.  Continue to follow on a regular basis.  Continue the carvedilol and Entresto medications.

## 2025-07-01 LAB
ANION GAP SERPL CALCULATED.4IONS-SCNC: 11 MMOL/L (CALC) (ref 7–17)
BUN SERPL-MCNC: 23 MG/DL (ref 7–25)
BUN/CREAT SERPL: ABNORMAL (CALC) (ref 6–22)
CALCIUM SERPL-MCNC: 9.7 MG/DL (ref 8.6–10.3)
CHLORIDE SERPL-SCNC: 105 MMOL/L (ref 98–110)
CHOLEST SERPL-MCNC: 229 MG/DL
CHOLEST/HDLC SERPL: 5.5 (CALC)
CO2 SERPL-SCNC: 23 MMOL/L (ref 20–32)
CREAT SERPL-MCNC: 1.11 MG/DL (ref 0.7–1.22)
EGFRCR SERPLBLD CKD-EPI 2021: 67 ML/MIN/1.73M2
GLUCOSE SERPL-MCNC: 134 MG/DL (ref 65–99)
HDLC SERPL-MCNC: 42 MG/DL
LDLC SERPL CALC-MCNC: 156 MG/DL (CALC)
NONHDLC SERPL-MCNC: 187 MG/DL (CALC)
POTASSIUM SERPL-SCNC: 4.9 MMOL/L (ref 3.5–5.3)
SODIUM SERPL-SCNC: 139 MMOL/L (ref 135–146)
TRIGL SERPL-MCNC: 175 MG/DL

## 2025-07-28 ENCOUNTER — TELEPHONE (OUTPATIENT)
Dept: CARDIOLOGY | Facility: CLINIC | Age: 80
End: 2025-07-28
Payer: MEDICARE

## 2025-08-18 ENCOUNTER — HOSPITAL ENCOUNTER (OUTPATIENT)
Dept: RADIOLOGY | Facility: HOSPITAL | Age: 80
Discharge: HOME | End: 2025-08-18
Payer: MEDICARE

## 2025-08-18 ENCOUNTER — OFFICE VISIT (OUTPATIENT)
Dept: OTOLARYNGOLOGY | Facility: CLINIC | Age: 80
End: 2025-08-18
Payer: MEDICARE

## 2025-08-18 ENCOUNTER — APPOINTMENT (OUTPATIENT)
Dept: UROLOGY | Facility: CLINIC | Age: 80
End: 2025-08-18
Payer: MEDICARE

## 2025-08-18 VITALS — BODY MASS INDEX: 33.07 KG/M2 | HEIGHT: 70 IN | WEIGHT: 231 LBS | TEMPERATURE: 96.8 F

## 2025-08-18 DIAGNOSIS — N20.0 KIDNEY STONES: ICD-10-CM

## 2025-08-18 DIAGNOSIS — H90.3 BILATERAL SENSORINEURAL HEARING LOSS: ICD-10-CM

## 2025-08-18 DIAGNOSIS — Z85.46 HISTORY OF PROSTATE CANCER: ICD-10-CM

## 2025-08-18 DIAGNOSIS — H61.23 BILATERAL IMPACTED CERUMEN: Primary | ICD-10-CM

## 2025-08-18 PROCEDURE — 74018 RADEX ABDOMEN 1 VIEW: CPT

## 2025-08-18 PROCEDURE — 1126F AMNT PAIN NOTED NONE PRSNT: CPT | Performed by: UROLOGY

## 2025-08-18 PROCEDURE — 1160F RVW MEDS BY RX/DR IN RCRD: CPT | Performed by: OTOLARYNGOLOGY

## 2025-08-18 PROCEDURE — 1160F RVW MEDS BY RX/DR IN RCRD: CPT | Performed by: UROLOGY

## 2025-08-18 PROCEDURE — 1159F MED LIST DOCD IN RCRD: CPT | Performed by: OTOLARYNGOLOGY

## 2025-08-18 PROCEDURE — 1036F TOBACCO NON-USER: CPT | Performed by: OTOLARYNGOLOGY

## 2025-08-18 PROCEDURE — 74018 RADEX ABDOMEN 1 VIEW: CPT | Performed by: RADIOLOGY

## 2025-08-18 PROCEDURE — 1159F MED LIST DOCD IN RCRD: CPT | Performed by: UROLOGY

## 2025-08-18 PROCEDURE — 99213 OFFICE O/P EST LOW 20 MIN: CPT | Performed by: OTOLARYNGOLOGY

## 2025-08-18 PROCEDURE — 99214 OFFICE O/P EST MOD 30 MIN: CPT | Performed by: UROLOGY

## 2025-08-18 PROCEDURE — G2211 COMPLEX E/M VISIT ADD ON: HCPCS | Performed by: UROLOGY

## 2025-08-18 RX ORDER — ASPIRIN 81 MG/1
81 TABLET ORAL DAILY
COMMUNITY

## 2025-08-18 ASSESSMENT — PAIN SCALES - GENERAL: PAINLEVEL_OUTOF10: 0-NO PAIN

## 2025-08-26 ENCOUNTER — OFFICE VISIT (OUTPATIENT)
Facility: CLINIC | Age: 80
End: 2025-08-26
Payer: MEDICARE

## 2025-08-26 VITALS
HEART RATE: 96 BPM | HEIGHT: 70 IN | WEIGHT: 232 LBS | SYSTOLIC BLOOD PRESSURE: 132 MMHG | BODY MASS INDEX: 33.21 KG/M2 | DIASTOLIC BLOOD PRESSURE: 78 MMHG

## 2025-08-26 DIAGNOSIS — I50.9 ACUTE ON CHRONIC HEART FAILURE, UNSPECIFIED HEART FAILURE TYPE: Primary | ICD-10-CM

## 2025-08-26 DIAGNOSIS — I50.22 HEART FAILURE WITH MID-RANGE EJECTION FRACTION (HFMEF): ICD-10-CM

## 2025-08-26 LAB
ATRIAL RATE: 95 BPM
P AXIS: 51 DEGREES
P OFFSET: 172 MS
P ONSET: 118 MS
PR INTERVAL: 212 MS
Q ONSET: 224 MS
QRS COUNT: 15 BEATS
QRS DURATION: 146 MS
QT INTERVAL: 392 MS
QTC CALCULATION(BAZETT): 492 MS
QTC FREDERICIA: 456 MS
R AXIS: 86 DEGREES
T AXIS: 21 DEGREES
T OFFSET: 420 MS
VENTRICULAR RATE: 95 BPM

## 2025-08-26 PROCEDURE — 93005 ELECTROCARDIOGRAM TRACING: CPT | Performed by: INTERNAL MEDICINE

## 2025-08-26 PROCEDURE — 99213 OFFICE O/P EST LOW 20 MIN: CPT | Performed by: INTERNAL MEDICINE

## 2025-08-26 PROCEDURE — 1036F TOBACCO NON-USER: CPT | Performed by: INTERNAL MEDICINE

## 2025-08-26 PROCEDURE — 3075F SYST BP GE 130 - 139MM HG: CPT | Performed by: INTERNAL MEDICINE

## 2025-08-26 PROCEDURE — 1126F AMNT PAIN NOTED NONE PRSNT: CPT | Performed by: INTERNAL MEDICINE

## 2025-08-26 PROCEDURE — 1159F MED LIST DOCD IN RCRD: CPT | Performed by: INTERNAL MEDICINE

## 2025-08-26 PROCEDURE — 3078F DIAST BP <80 MM HG: CPT | Performed by: INTERNAL MEDICINE

## 2025-08-26 PROCEDURE — 99212 OFFICE O/P EST SF 10 MIN: CPT

## 2025-08-26 ASSESSMENT — ENCOUNTER SYMPTOMS
LOSS OF SENSATION IN FEET: 0
OCCASIONAL FEELINGS OF UNSTEADINESS: 0
DEPRESSION: 0

## 2025-08-26 ASSESSMENT — PAIN SCALES - GENERAL: PAINLEVEL_OUTOF10: 0-NO PAIN

## 2025-09-05 ENCOUNTER — HOSPITAL ENCOUNTER (OUTPATIENT)
Dept: RADIOLOGY | Facility: HOSPITAL | Age: 80
Discharge: HOME | End: 2025-09-05
Payer: MEDICARE

## 2025-09-05 DIAGNOSIS — I50.9 ACUTE ON CHRONIC HEART FAILURE, UNSPECIFIED HEART FAILURE TYPE: ICD-10-CM

## 2025-09-05 DIAGNOSIS — I50.22 HEART FAILURE WITH MID-RANGE EJECTION FRACTION (HFMEF): ICD-10-CM

## 2025-09-05 PROCEDURE — 78472 GATED HEART PLANAR SINGLE: CPT

## 2025-09-05 PROCEDURE — 3430000001 HC RX 343 DIAGNOSTIC RADIOPHARMACEUTICALS: Performed by: INTERNAL MEDICINE

## 2025-09-05 PROCEDURE — A9560 TC99M LABELED RBC: HCPCS | Performed by: INTERNAL MEDICINE

## 2025-09-05 RX ADMIN — TECHNETIUM TC 99M-LABELED RED BLOOD CELLS 24.6 MILLICURIE: KIT at 13:42

## 2025-12-01 ENCOUNTER — APPOINTMENT (OUTPATIENT)
Dept: OTOLARYNGOLOGY | Facility: CLINIC | Age: 80
End: 2025-12-01
Payer: MEDICARE

## 2026-08-24 ENCOUNTER — APPOINTMENT (OUTPATIENT)
Dept: UROLOGY | Facility: CLINIC | Age: 81
End: 2026-08-24
Payer: MEDICARE